# Patient Record
Sex: FEMALE | Race: WHITE | Employment: FULL TIME | URBAN - METROPOLITAN AREA
[De-identification: names, ages, dates, MRNs, and addresses within clinical notes are randomized per-mention and may not be internally consistent; named-entity substitution may affect disease eponyms.]

---

## 2017-05-24 ENCOUNTER — OFFICE VISIT (OUTPATIENT)
Dept: OBGYN CLINIC | Age: 27
End: 2017-05-24

## 2017-05-24 VITALS
BODY MASS INDEX: 46.67 KG/M2 | WEIGHT: 253.6 LBS | DIASTOLIC BLOOD PRESSURE: 72 MMHG | SYSTOLIC BLOOD PRESSURE: 118 MMHG | HEIGHT: 62 IN

## 2017-05-24 DIAGNOSIS — N94.6 DYSMENORRHEA: Primary | ICD-10-CM

## 2017-05-24 DIAGNOSIS — Z20.2 POSSIBLE EXPOSURE TO STD: ICD-10-CM

## 2017-05-24 DIAGNOSIS — N92.6 IRREGULAR BLEEDING: ICD-10-CM

## 2017-05-24 RX ORDER — DOXYCYCLINE 100 MG/1
100 CAPSULE ORAL 2 TIMES DAILY
Qty: 14 CAP | Refills: 0 | Status: SHIPPED | OUTPATIENT
Start: 2017-05-24 | End: 2017-05-31

## 2017-05-24 RX ORDER — MEDROXYPROGESTERONE ACETATE 10 MG/1
10 TABLET ORAL DAILY
Qty: 10 TAB | Refills: 0 | Status: SHIPPED | OUTPATIENT
Start: 2017-05-24 | End: 2017-06-03

## 2017-05-24 NOTE — PATIENT INSTRUCTIONS
Pelvic Exam: Care Instructions  Your Care Instructions    When your doctor examines all of your pelvic organs, it's called a pelvic exam. Two good reasons to have this kind of exam are to check for sexually transmitted infections (STIs) and to get a Pap test. A Pap test is also called a Pap smear. It checks for early changes that can lead to cancer of the cervix. Sometimes a pelvic exam is part of a regular checkup. In this case, you can do some things to make your test results as accurate as possible. · Try to schedule the exam when you don't have your period. · Don't use douches, tampons, or vaginal medicines, sprays, or powders for 24 hours before your exam.  · Don't have sex for 24 hours before your exam.  Other times, women have this kind of exam at any time of the month. This is because they have pelvic pain, bleeding, or discharge. Or they may have another pelvic problem. Before your exam, it's important to share some information with your doctor. For example, if you are a survivor of rape or sexual abuse, you can talk about any concerns you may have. Your doctor will also want to know if you are pregnant or use birth control. And he or she will want to hear about any problems, surgeries, or procedures you have had in your pelvic area. You will also need to tell your doctor when your last period was. Follow-up care is a key part of your treatment and safety. Be sure to make and go to all appointments, and call your doctor if you are having problems. It's also a good idea to know your test results and keep a list of the medicines you take. How is a pelvic exam done? · During a pelvic exam, you will:  ¨ Take off your clothes below the waist. You will get a paper or cloth cover to put over the lower half of your body. May Fang on your back on an exam table. Your feet will be raised above you. Stirrups will support your feet. · The doctor will:  Davidel Grady you to relax your knees.  Your knees need to lean out, toward the walls. ¨ Check the opening of your vagina for sores or swelling. ¨ Gently put a tool called a speculum into your vagina. It opens the vagina a little bit. You will feel some pressure. But if you are relaxed, it will not hurt. It lets your doctor see inside the vagina. ¨ Use a small brush, spatula, or swab to get a sample of cells, if you are having a Pap test or culture. The doctor then removes the speculum. ¨ Put on gloves and put one or two fingers of one hand into your vagina. The other hand goes on your lower belly. This lets your doctor feel your pelvic organs. You will probably feel some pressure. Try to stay relaxed. ¨ Put one gloved finger into your rectum and one into your vagina, if needed. This can also help check your pelvic organs. This exam takes about 10 minutes. At the end, you will get a washcloth or tissue to clean your vaginal area. It's normal to have some discharge after this exam. You can then get dressed. Some test results may be ready right away. But results from a culture or a Pap test may take several days or a few weeks. Why should you have a pelvic exam?  · You want to have recommended screening tests. This includes a Pap test.  · You think you have a vaginal infection. Signs include itching, burning, or unusual discharge. · You might have been exposed to a sexually transmitted infection (STI), such as chlamydia or herpes. · You have vaginal bleeding that is not part of your normal menstrual period. · You have pain in your belly or pelvis. · You have been sexually assaulted. A pelvic exam lets your doctor collect evidence and check for STIs. · You are pregnant. · You are having trouble getting pregnant. What are the risks of a pelvic exam?  There are no risks from a pelvic exam.  When should you call for help?   Watch closely for changes in your health, and be sure to contact your doctor if:  · You have heavy bleeding or discharge from your vagina after the exam.  Where can you learn more? Go to http://leonidas-yovana.info/. Enter N419 in the search box to learn more about \"Pelvic Exam: Care Instructions. \"  Current as of: October 13, 2016  Content Version: 11.2  © 9270-9185 [employer]. Care instructions adapted under license by [employer] (which disclaims liability or warranty for this information). If you have questions about a medical condition or this instruction, always ask your healthcare professional. [place] [address] any warranty or liability for your use of this information.
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
## 2017-05-25 LAB
COMMENT, 144067: NORMAL
HBV SURFACE AG SERPL QL IA: NEGATIVE
HCV AB S/CO SERPL IA: <0.1 S/CO RATIO (ref 0–0.9)
HIV 1+2 AB+HIV1 P24 AG SERPL QL IA: NON REACTIVE
HSV2 IGG SER IA-ACNC: <0.91 INDEX (ref 0–0.9)
RPR SER QL: NON REACTIVE
TSH SERPL DL<=0.005 MIU/L-ACNC: 1.19 UIU/ML (ref 0.45–4.5)

## 2017-05-26 LAB
C TRACH RRNA SPEC QL NAA+PROBE: NEGATIVE
N GONORRHOEA RRNA SPEC QL NAA+PROBE: NEGATIVE
T VAGINALIS RRNA SPEC QL NAA+PROBE: NEGATIVE

## 2017-07-21 ENCOUNTER — OFFICE VISIT (OUTPATIENT)
Dept: OBGYN CLINIC | Age: 27
End: 2017-07-21

## 2017-07-21 VITALS
DIASTOLIC BLOOD PRESSURE: 84 MMHG | SYSTOLIC BLOOD PRESSURE: 132 MMHG | WEIGHT: 255 LBS | HEIGHT: 62 IN | BODY MASS INDEX: 46.93 KG/M2

## 2017-07-21 DIAGNOSIS — Z01.419 WELL FEMALE EXAM WITH ROUTINE GYNECOLOGICAL EXAM: Primary | ICD-10-CM

## 2017-07-21 NOTE — PATIENT INSTRUCTIONS
Pelvic Exam: Care Instructions  Your Care Instructions    When your doctor examines all of your pelvic organs, it's called a pelvic exam. Two good reasons to have this kind of exam are to check for sexually transmitted infections (STIs) and to get a Pap test. A Pap test is also called a Pap smear. It checks for early changes that can lead to cancer of the cervix. Sometimes a pelvic exam is part of a regular checkup. In this case, you can do some things to make your test results as accurate as possible. · Try to schedule the exam when you don't have your period. · Don't use douches, tampons, or vaginal medicines, sprays, or powders for 24 hours before your exam.  · Don't have sex for 24 hours before your exam.  Other times, women have this kind of exam at any time of the month. This is because they have pelvic pain, bleeding, or discharge. Or they may have another pelvic problem. Before your exam, it's important to share some information with your doctor. For example, if you are a survivor of rape or sexual abuse, you can talk about any concerns you may have. Your doctor will also want to know if you are pregnant or use birth control. And he or she will want to hear about any problems, surgeries, or procedures you have had in your pelvic area. You will also need to tell your doctor when your last period was. Follow-up care is a key part of your treatment and safety. Be sure to make and go to all appointments, and call your doctor if you are having problems. It's also a good idea to know your test results and keep a list of the medicines you take. How is a pelvic exam done? · During a pelvic exam, you will:  ¨ Take off your clothes below the waist. You will get a paper or cloth cover to put over the lower half of your body. Clement Shawn on your back on an exam table. Your feet will be raised above you. Stirrups will support your feet. · The doctor will:  Percy Maguire you to relax your knees.  Your knees need to lean out, toward the walls. ¨ Check the opening of your vagina for sores or swelling. ¨ Gently put a tool called a speculum into your vagina. It opens the vagina a little bit. You will feel some pressure. But if you are relaxed, it will not hurt. It lets your doctor see inside the vagina. ¨ Use a small brush, spatula, or swab to get a sample of cells, if you are having a Pap test or culture. The doctor then removes the speculum. ¨ Put on gloves and put one or two fingers of one hand into your vagina. The other hand goes on your lower belly. This lets your doctor feel your pelvic organs. You will probably feel some pressure. Try to stay relaxed. ¨ Put one gloved finger into your rectum and one into your vagina, if needed. This can also help check your pelvic organs. This exam takes about 10 minutes. At the end, you will get a washcloth or tissue to clean your vaginal area. It's normal to have some discharge after this exam. You can then get dressed. Some test results may be ready right away. But results from a culture or a Pap test may take several days or a few weeks. Why should you have a pelvic exam?  · You want to have recommended screening tests. This includes a Pap test.  · You think you have a vaginal infection. Signs include itching, burning, or unusual discharge. · You might have been exposed to a sexually transmitted infection (STI), such as chlamydia or herpes. · You have vaginal bleeding that is not part of your normal menstrual period. · You have pain in your belly or pelvis. · You have been sexually assaulted. A pelvic exam lets your doctor collect evidence and check for STIs. · You are pregnant. · You are having trouble getting pregnant. What are the risks of a pelvic exam?  There are no risks from a pelvic exam.  When should you call for help?   Watch closely for changes in your health, and be sure to contact your doctor if:  · You have heavy bleeding or discharge from your vagina after the exam.  Where can you learn more? Go to http://leonidas-yovana.info/. Enter K337 in the search box to learn more about \"Pelvic Exam: Care Instructions. \"  Current as of: October 13, 2016  Content Version: 11.3  © 5550-6261 [employer], [employer]. Care instructions adapted under license by [employer] (which disclaims liability or warranty for this information). If you have questions about a medical condition or this instruction, always ask your healthcare professional. [address] any warranty or liability for your use of this information.

## 2017-07-21 NOTE — MR AVS SNAPSHOT
Visit Information Date & Time Provider Department Dept. Phone Encounter #  
 7/21/2017  9:30 AM Louisa Verma MD [facility] [phone] [id number] Upcoming Health Maintenance Date Due  
 HPV AGE 9Y-34Y (1 of 3 - Female 3 Dose Series) 12/13/2001 INFLUENZA AGE 9 TO ADULT 8/1/2017 PAP AKA CERVICAL CYTOLOGY 6/15/2019 Allergies as of 7/21/2017  Review Complete On: 7/21/2017 By: Frankie Juan No Known Allergies Current Immunizations  Never Reviewed No immunizations on file. Not reviewed this visit You Were Diagnosed With   
  
 Codes Comments Well female exam with routine gynecological exam    -  Primary ICD-10-CM: N72.353 ICD-9-CM: V72.31 Vitals BP Height(growth percentile) Weight(growth percentile) BMI OB Status Smoking Status 132/84 5' 2\" (1.575 m) 255 lb (115.7 kg) 46.64 kg/m2 IUD Never Smoker BMI and BSA Data Body Mass Index Body Surface Area  
 46.64 kg/m 2 2.25 m 2 Preferred Pharmacy Pharmacy Name Phone [facility] [address] [phone] Your Updated Medication List  
  
Notice  As of 7/21/2017 10:02 AM  
 You have not been prescribed any medications. Patient Instructions Pelvic Exam: Care Instructions Your Care Instructions When your doctor examines all of your pelvic organs, it's called a pelvic exam. Two good reasons to have this kind of exam are to check for sexually transmitted infections (STIs) and to get a Pap test. A Pap test is also called a Pap smear. It checks for early changes that can lead to cancer of the cervix. Sometimes a pelvic exam is part of a regular checkup. In this case, you can do some things to make your test results as accurate as possible. · Try to schedule the exam when you don't have your period.  
· Don't use douches, tampons, or vaginal medicines, sprays, or powders for 24 hours before your exam. 
· Don't have sex for 24 hours before your exam. 
Other times, women have this kind of exam at any time of the month. This is because they have pelvic pain, bleeding, or discharge. Or they may have another pelvic problem. Before your exam, it's important to share some information with your doctor. For example, if you are a survivor of rape or sexual abuse, you can talk about any concerns you may have. Your doctor will also want to know if you are pregnant or use birth control. And he or she will want to hear about any problems, surgeries, or procedures you have had in your pelvic area. You will also need to tell your doctor when your last period was. Follow-up care is a key part of your treatment and safety. Be sure to make and go to all appointments, and call your doctor if you are having problems. It's also a good idea to know your test results and keep a list of the medicines you take. How is a pelvic exam done? · During a pelvic exam, you will: ¨ Take off your clothes below the waist. You will get a paper or cloth cover to put over the lower half of your body. Adina Loges on your back on an exam table. Your feet will be raised above you. Stirrups will support your feet. · The doctor will: ¨ Ask you to relax your knees. Your knees need to lean out, toward the walls. ¨ Check the opening of your vagina for sores or swelling. ¨ Gently put a tool called a speculum into your vagina. It opens the vagina a little bit. You will feel some pressure. But if you are relaxed, it will not hurt. It lets your doctor see inside the vagina. ¨ Use a small brush, spatula, or swab to get a sample of cells, if you are having a Pap test or culture. The doctor then removes the speculum. ¨ Put on gloves and put one or two fingers of one hand into your vagina. The other hand goes on your lower belly. This lets your doctor feel your pelvic organs. You will probably feel some pressure. Try to stay relaxed. ¨ Put one gloved finger into your rectum and one into your vagina, if needed. This can also help check your pelvic organs. This exam takes about 10 minutes. At the end, you will get a washcloth or tissue to clean your vaginal area. It's normal to have some discharge after this exam. You can then get dressed. Some test results may be ready right away. But results from a culture or a Pap test may take several days or a few weeks. Why should you have a pelvic exam? 
· You want to have recommended screening tests. This includes a Pap test. 
· You think you have a vaginal infection. Signs include itching, burning, or unusual discharge. · You might have been exposed to a sexually transmitted infection (STI), such as chlamydia or herpes. · You have vaginal bleeding that is not part of your normal menstrual period. · You have pain in your belly or pelvis. · You have been sexually assaulted. A pelvic exam lets your doctor collect evidence and check for STIs. · You are pregnant. · You are having trouble getting pregnant. What are the risks of a pelvic exam? 
There are no risks from a pelvic exam. 
When should you call for help? Watch closely for changes in your health, and be sure to contact your doctor if: 
· You have heavy bleeding or discharge from your vagina after the exam. 
Where can you learn more? Go to http://leonidas-yovana.info/. Enter Y974 in the search box to learn more about \"Pelvic Exam: Care Instructions. \" Current as of: October 13, 2016 Content Version: 11.3 © 2104-2287 [employer]. Care instructions adapted under license by [employer] (which disclaims liability or warranty for this information). If you have questions about a medical condition or this instruction, always ask your healthcare professional. [place] [address] any warranty or liability for your use of this information. Introducing [facility]!    
 Dear Lorraine Schneider: 
 Thank you for requesting a GoAlbert account. Our records indicate that you already have an active GoAlbert account. You can access your account anytime at [email or web address] Did you know that you can access your hospital and ER discharge instructions at any time in GoAlbert? You can also review all of your test results from your hospital stay or ER visit. Additional Information If you have questions, please visit the Frequently Asked Questions section of the GoAlbert website at [email or web address] Remember, GoAlbert is NOT to be used for urgent needs. For medical emergencies, dial 911. Now available from your iPhone and Android! Please provide this summary of care documentation to your next provider. If you have any questions after today's visit, please call [phone].

## 2017-07-21 NOTE — PROGRESS NOTES
Ximena Acosta is a ,  32 y.o. female [place] whose No LMP recorded. Patient is not currently having periods (Reason: IUD). who presents for her annual checkup. She is having no significant problems. Took Doxy last year with complete resolution of abnl bleeding with IUD    With regard to the Gardisil vaccine, she has received all 3 injections. Menstrual status:    Her periods are spotting, minimal to nonexistent in flow. She is using one to two pads or tampons per day, minimal to none using Mirena. She denies dysmenorrhea. She reports no premenstrual symptoms. Contraception:    The current method of family planning is IUD and She declines contraception and counseling. Sexual history:    She  reports that she currently engages in sexual activity and has had male partners. She reports using the following method of birth control/protection: IUD. Medical conditions:    Since her last annual GYN exam about one year ago, she has not the following changes in her health history: none. Pap and Mammogram History:    Her most recent Pap smear was normal obtained 1 year(s) ago. The patient has never had a mammogram.    The patient does not have a family history of breast cancer. Past Medical History:   Diagnosis Date    Anorexia     Bulimia     Encounter for insertion of mirena IUD 2016    Mirena placed    IBS (irritable bowel syndrome)     Obesity      History reviewed. No pertinent surgical history. Allergies: Review of patient's allergies indicates no known allergies. Social History     Social History    Marital status: SINGLE     Spouse name: N/A    Number of children: N/A    Years of education: N/A     Occupational History    Not on file.      Social History Main Topics    Smoking status: Never Smoker    Smokeless tobacco: Never Used    Alcohol use 0.0 oz/week     0 Standard drinks or equivalent per week      Comment: 1 drink per month    Drug use: No    Sexual activity: Yes     Partners: Male     Birth control/ protection: IUD     Other Topics Concern    Not on file     Social History Narrative     Tobacco History:  reports that she has never smoked. She has never used smokeless tobacco.  Alcohol Abuse:  reports that she drinks alcohol. Drug Abuse:  reports that she does not use illicit drugs. There is no problem list on file for this patient.       Review of Systems - History obtained from the patient  Constitutional: negative for weight loss, fever, night sweats  HEENT: negative for hearing loss, earache, congestion, snoring, sorethroat  CV: negative for chest pain, palpitations, edema  Resp: negative for cough, shortness of breath, wheezing  GI: negative for change in bowel habits, abdominal pain, black or bloody stools  : negative for frequency, dysuria, hematuria, vaginal discharge  MSK: negative for back pain, joint pain, muscle pain  Breast: negative for breast lumps, nipple discharge, galactorrhea  Skin :negative for itching, rash, hives  Neuro: negative for dizziness, headache, confusion, weakness  Psych: negative for anxiety, depression, change in mood  Heme/lymph: negative for bleeding, bruising, pallor    Physical Exam    Visit Vitals    /84    Ht 5' 2\" (1.575 m)    Wt 255 lb (115.7 kg)    BMI 46.64 kg/m2       Constitutional  · Appearance: well-nourished, well developed, alert, in no acute distress    HENT  · Head and Face: appears normal    Neck  · Inspection/Palpation: normal appearance, no masses or tenderness  · Lymph Nodes: no lymphadenopathy present  · Thyroid: gland size normal, nontender, no nodules or masses present on palpation    Chest  · Respiratory Effort: breathing normal  · Auscultation: normal breath sounds    Cardiovascular  · Heart:  · Auscultation: regular rate and rhythm without murmur    Breasts  · Inspection of Breasts: breasts symmetrical, no skin changes, no discharge present, nipple appearance normal, no skin retraction present  · Palpation of Breasts and Axillae: no masses present on palpation, no breast tenderness  · Axillary Lymph Nodes: no lymphadenopathy present    Gastrointestinal  · Abdominal Examination: abdomen non-tender to palpation, normal bowel sounds, no masses present  · Liver and spleen: no hepatomegaly present, spleen not palpable  · Hernias: no hernias identified    Genitourinary  · External Genitalia: normal appearance for age, no discharge present, no tenderness present, no inflammatory lesions present, no masses present, no atrophy present  · Vagina: normal vaginal vault without central or paravaginal defects, no discharge present, no inflammatory lesions present, no masses present  · Bladder: non-tender to palpation  · Urethra: appears normal  · Cervix: normal, string seen   · Uterus: normal size, shape and consistency  · Adnexa: no adnexal tenderness present, no adnexal masses present  · Perineum: perineum within normal limits, no evidence of trauma, no rashes or skin lesions present  · Anus: anus within normal limits, no hemorrhoids present  · Inguinal Lymph Nodes: no lymphadenopathy present    Skin  · General Inspection: no rash, no lesions identified    Neurologic/Psychiatric  · Mental Status:  · Orientation: grossly oriented to person, place and time  · Mood and Affect: mood normal, affect appropriate    . Assessment:  Routine gynecologic examination  Her current medical status is satisfactory with no evidence of significant gynecologic issues.     Plan:  Counseled re: diet, exercise, healthy lifestyle  Return for yearly wellness visits

## 2017-08-08 ENCOUNTER — OFFICE VISIT (OUTPATIENT)
Dept: INTERNAL MEDICINE CLINIC | Age: 27
End: 2017-08-08

## 2017-08-08 VITALS
DIASTOLIC BLOOD PRESSURE: 79 MMHG | SYSTOLIC BLOOD PRESSURE: 118 MMHG | OXYGEN SATURATION: 98 % | RESPIRATION RATE: 16 BRPM | WEIGHT: 253 LBS | HEART RATE: 90 BPM | HEIGHT: 62 IN | BODY MASS INDEX: 46.56 KG/M2 | TEMPERATURE: 97.9 F

## 2017-08-08 DIAGNOSIS — E66.9 OBESITY, UNSPECIFIED OBESITY SEVERITY, UNSPECIFIED OBESITY TYPE: ICD-10-CM

## 2017-08-08 DIAGNOSIS — F32.A DEPRESSION, UNSPECIFIED DEPRESSION TYPE: ICD-10-CM

## 2017-08-08 DIAGNOSIS — Z76.89 ENCOUNTER TO ESTABLISH CARE WITH NEW DOCTOR: Primary | ICD-10-CM

## 2017-08-08 DIAGNOSIS — L30.9 ECZEMA, UNSPECIFIED TYPE: ICD-10-CM

## 2017-08-08 DIAGNOSIS — Z86.59 HISTORY OF BIPOLAR DISORDER: ICD-10-CM

## 2017-08-08 DIAGNOSIS — R53.83 FATIGUE, UNSPECIFIED TYPE: ICD-10-CM

## 2017-08-08 PROBLEM — F31.9 BIPOLAR 1 DISORDER (HCC): Status: ACTIVE | Noted: 2017-08-08

## 2017-08-08 RX ORDER — TRIAMCINOLONE ACETONIDE 1 MG/G
OINTMENT TOPICAL 2 TIMES DAILY
Qty: 80 G | Refills: 0 | Status: SHIPPED | OUTPATIENT
Start: 2017-08-08

## 2017-08-08 NOTE — PROGRESS NOTES
1. Have you been to the ER, urgent care clinic since your last visit? Hospitalized since your last visit? No    2. Have you seen or consulted any other health care providers outside of the [address] [place] [address] since your last visit? Include any pap smears or colon screening. Dr.Cathy Felicita Pedersen in [place], for PCP. Pt was diagnosed with bipolar type 1 with psychosis when she was 16-18. Pt states she came off of medications at 20. Pt has a 15 year old daughter.

## 2017-08-08 NOTE — PROGRESS NOTES
Mikaela Doshi is a 32 y.o. female who presents today for Establish Care; Depression; and Anxiety  . She has a history of   Patient Active Problem List   Diagnosis Code    Bipolar 1 disorder ([facility] F31.9    Depression F32.9   . Today patient is here to establish care. Previous PCP In [place]. she is switching because moved here two years ago. Sees Dr. Susan Beatty for GYN. Records are not available for me to review. she does have other concerns. Eczema: hands and eyes. Using aveno eczema. Worse on her hands. Anxiety/Depression: Diagnosed Bipolar I with psychosis at 12. History of self harm. Suicide attempt in the past with OD. Last attempt was at 19y.o. Has been on SSRI's in the past. Antipsychotics in the past. On no meds for 6 yrs. Over the last several months, seems to have some anxiety and depression. Some fatigue. Decreased drive. No significant mood swings. No current SI/HI. Procrastination seems to be main issue. Home life stable. With mother and father and child. She is in school for [employer]. 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 Mother: with Wellbutrin and has been doing well. Father: Was on SSRI in the past.    No history of sz d/o. Pt denies any manic or hypomanic episodes in the past 6 years. Obesity: has been an issue for some time. Has a history of eating disorder. History of binge eating. No current laxative use or binging or purging. Pt has regained about 20# since moving here. Now exercising regularly. Watching diet. No current orthopedic issues. Social:  No tobacco. No EtOH. No illicit's. Family: Mother: DM, Depressoin    Father HTN. GYN: Dr. Susan Beatty. Has IUD. Father with congenital hip issues. ? What this is. ROS  Review of Systems   Constitutional: Negative for chills, fever and weight loss. HENT: Negative for hearing loss and tinnitus. Eyes: Negative for blurred vision, double vision and photophobia.    Respiratory: Negative for cough and shortness of breath. Cardiovascular: Negative for chest pain, palpitations and leg swelling. Gastrointestinal: Negative for abdominal pain, diarrhea, nausea and vomiting. Genitourinary: Negative for dysuria, frequency and urgency. Skin: Negative for itching and rash. Neurological: Negative. Negative for headaches. Endo/Heme/Allergies: Does not bruise/bleed easily. Psychiatric/Behavioral: Positive for depression. Negative for hallucinations, memory loss, substance abuse and suicidal ideas. The patient is nervous/anxious. The patient does not have insomnia. Visit Vitals    /79 (BP 1 Location: Left arm, BP Patient Position: Sitting)    Pulse 90    Temp 97.9 °F (36.6 °C) (Oral)    Resp 16    Ht 5' 2\" (1.575 m)    Wt 253 lb (114.8 kg)    SpO2 98%    BMI 46.27 kg/m2       Physical Exam   Constitutional: She is oriented to person, place, and time and well-developed, well-nourished, and in no distress. No distress. HENT:   Head: Normocephalic and atraumatic. Mouth/Throat: No oropharyngeal exudate. Eyes: Conjunctivae are normal. Pupils are equal, round, and reactive to light. No scleral icterus. Neck: Normal range of motion. No JVD present. No thyromegaly present. Cardiovascular: Normal rate and regular rhythm. Exam reveals no gallop and no friction rub. No murmur heard. Pulmonary/Chest: Effort normal and breath sounds normal. No respiratory distress. She has no wheezes. Abdominal: Soft. Bowel sounds are normal. She exhibits no distension. There is no rebound and no guarding. Musculoskeletal: Normal range of motion. She exhibits no edema. Neurological: She is alert and oriented to person, place, and time. No cranial nerve deficit. Skin: Skin is dry. No rash noted. She is not diaphoretic. Multiple tattoos.     Psychiatric: Mood, memory, affect and judgment normal.       Current Outpatient Prescriptions   Medication Sig    triamcinolone acetonide (KENALOG) 0.1 % ointment Apply  to affected area two (2) times a day. use thin layer     No current facility-administered medications for this visit. Past Medical History:   Diagnosis Date    Anorexia     Bulimia     Encounter for insertion of mirena IUD 06/24/2016    Mirena placed    IBS (irritable bowel syndrome)     Obesity       History reviewed. No pertinent surgical history. Social History   Substance Use Topics    Smoking status: Never Smoker    Smokeless tobacco: Never Used    Alcohol use 0.0 oz/week     0 Standard drinks or equivalent per week      Comment: 1 drink per month      Family History   Problem Relation Age of Onset    Diabetes Mother     Hypertension Father     Diabetes Brother     HIV/AIDS Brother     Breast Cancer Maternal Grandmother         No Known Allergies     Assessment/Plan  Diagnoses and all orders for this visit:    1. Encounter to establish care with new doctor - no recent PCP    2. Depression, unspecified depression type - new episodes of this. No s/s of bipolar, but concern with wellbutrin would be a manic episode. Mother though is doing well. Will consider a low dose and closely monitor. Will likely help with fatigue/weight. Blood work today. She is to see our psychologist.   -     REFERRAL TO PSYCHOLOGY    3. History of bipolar disorder  -     REFERRAL TO PSYCHOLOGY    4. Fatigue, unspecified type  -     CBC WITH AUTOMATED DIFF  -     METABOLIC PANEL, COMPREHENSIVE  -     HEMOGLOBIN A1C WITH EAG  -     LIPID PANEL    5. Obesity, unspecified obesity severity, unspecified obesity type  -     CBC WITH AUTOMATED DIFF  -     METABOLIC PANEL, COMPREHENSIVE  -     HEMOGLOBIN A1C WITH EAG  -     LIPID PANEL    6. Eczema, unspecified type - Worse on hands. PRN topical steroid. -     triamcinolone acetonide (KENALOG) 0.1 % ointment; Apply  to affected area two (2) times a day.  use thin layer    Over 50% of a visit of 45 minutes spent reviewing diagnosis and treatment options and side effects of medicines. Follow-up Disposition:  Return in about 2 weeks (around 8/22/2017).     Alma Lind MD  8/8/2017

## 2017-08-08 NOTE — MR AVS SNAPSHOT
Visit Information Date & Time Provider Department Dept. Phone Encounter #  
 8/8/2017  9:30 AM Fabián Sanchez MD [facility] [address]  [id number] Follow-up Instructions Return in about 2 weeks (around 8/22/2017). Upcoming Health Maintenance Date Due  
 HPV AGE 9Y-34Y (1 of 3 - Female 3 Dose Series) 12/13/2001 DTaP/Tdap/Td series (1 - Tdap) 12/13/2011 INFLUENZA AGE 9 TO ADULT 8/1/2017 PAP AKA CERVICAL CYTOLOGY 6/15/2019 Allergies as of 8/8/2017  Review Complete On: 8/8/2017 By: Fabián Sanchez MD  
 No Known Allergies Current Immunizations  Never Reviewed No immunizations on file. Not reviewed this visit You Were Diagnosed With   
  
 Codes Comments Encounter to establish care with new doctor    -  Primary ICD-10-CM: Z71.89 ICD-9-CM: V65.8 Depression, unspecified depression type     ICD-10-CM: F32.9 ICD-9-CM: 405 History of bipolar disorder     ICD-10-CM: Z86.59 
ICD-9-CM: V11.1 Fatigue, unspecified type     ICD-10-CM: R53.83 ICD-9-CM: 780.79 Obesity, unspecified obesity severity, unspecified obesity type     ICD-10-CM: E66.9 ICD-9-CM: 278.00 Eczema, unspecified type     ICD-10-CM: L30.9 ICD-9-CM: 692.9 Vitals BP Pulse Temp Resp Height(growth percentile) Weight(growth percentile) 118/79 (BP 1 Location: Left arm, BP Patient Position: Sitting) 90 97.9 °F (36.6 °C) (Oral) 16 5' 2\" (1.575 m) 253 lb (114.8 kg) SpO2 BMI OB Status Smoking Status 98% 46.27 kg/m2 IUD Never Smoker Vitals History BMI and BSA Data Body Mass Index Body Surface Area  
 46.27 kg/m 2 2.24 m 2 Preferred Pharmacy Pharmacy Name Phone [facility] [phone] Your Updated Medication List  
  
   
This list is accurate as of: 8/8/17 10:50 AM.  Always use your most recent med list.  
  
  
  
  
 triamcinolone acetonide 0.1 % ointment Commonly known as:  KENALOG Apply  to affected area two (2) times a day. use thin layer Prescriptions Sent to Pharmacy Refills  
 triamcinolone acetonide (KENALOG) 0.1 % ointment 0 Sig: Apply  to affected area two (2) times a day. use thin layer Class: Normal  
 Pharmacy: [facility] [address] [facility] #: [phone] Route: Topical  
  
We Performed the Following CBC WITH AUTOMATED DIFF [90775 CPT(R)] HEMOGLOBIN A1C WITH EAG [21001 CPT(R)] LIPID PANEL [91092 CPT(R)] METABOLIC PANEL, COMPREHENSIVE [71146 CPT(R)] REFERRAL TO PSYCHOLOGY [BGN42 Custom] Follow-up Instructions Return in about 2 weeks (around 8/22/2017). Referral Information Referral ID Referred By Referred To  
  
 [id number] [facility] [address] [facility] [address] [place] [address] Phone: [phone] Fax: [phone] Visits Status Start Date End Date 1 New Request 8/8/17 8/8/18 If your referral has a status of pending review or denied, additional information will be sent to support the outcome of this decision. Introducing \[facility]\"" & [facility] SERVICES! Dear Ellen Dangelo: Thank you for requesting a [employer] account. Our records indicate that you already have an active [employer] account. You can access your account anytime at [email or web address] Did you know that you can access your hospital and ER discharge instructions at any time in [employer]? You can also review all of your test results from your hospital stay or ER visit. Additional Information If you have questions, please visit the Frequently Asked Questions section of the [employer] website at [email or web address] Remember, [employer] is NOT to be used for urgent needs. For medical emergencies, dial 911. Now available from your iPhone and Android! Please provide this summary of care documentation to your next provider. If you have any questions after today's visit, please call [phone].

## 2017-08-09 LAB
ALBUMIN SERPL-MCNC: 4.1 G/DL (ref 3.5–5.5)
ALBUMIN/GLOB SERPL: 1.6 {RATIO} (ref 1.2–2.2)
ALP SERPL-CCNC: 70 IU/L (ref 39–117)
ALT SERPL-CCNC: 12 IU/L (ref 0–32)
AST SERPL-CCNC: 20 IU/L (ref 0–40)
BASOPHILS # BLD AUTO: 0 X10E3/UL (ref 0–0.2)
BASOPHILS NFR BLD AUTO: 0 %
BILIRUB SERPL-MCNC: 0.5 MG/DL (ref 0–1.2)
BUN SERPL-MCNC: 7 MG/DL (ref 6–20)
BUN/CREAT SERPL: 10 (ref 9–23)
CALCIUM SERPL-MCNC: 8.9 MG/DL (ref 8.7–10.2)
CHLORIDE SERPL-SCNC: 105 MMOL/L (ref 96–106)
CHOLEST SERPL-MCNC: 116 MG/DL (ref 100–199)
CO2 SERPL-SCNC: 23 MMOL/L (ref 18–29)
CREAT SERPL-MCNC: 0.67 MG/DL (ref 0.57–1)
EOSINOPHIL # BLD AUTO: 0.1 X10E3/UL (ref 0–0.4)
EOSINOPHIL NFR BLD AUTO: 1 %
ERYTHROCYTE [DISTWIDTH] IN BLOOD BY AUTOMATED COUNT: 13.8 % (ref 12.3–15.4)
EST. AVERAGE GLUCOSE BLD GHB EST-MCNC: 94 MG/DL
GLOBULIN SER CALC-MCNC: 2.6 G/DL (ref 1.5–4.5)
GLUCOSE SERPL-MCNC: 67 MG/DL (ref 65–99)
HBA1C MFR BLD: 4.9 % (ref 4.8–5.6)
HCT VFR BLD AUTO: 38.6 % (ref 34–46.6)
HDLC SERPL-MCNC: 42 MG/DL
HGB BLD-MCNC: 12.8 G/DL (ref 11.1–15.9)
IMM GRANULOCYTES # BLD: 0 X10E3/UL (ref 0–0.1)
IMM GRANULOCYTES NFR BLD: 0 %
INTERPRETATION, 910389: NORMAL
LDLC SERPL CALC-MCNC: 61 MG/DL (ref 0–99)
LYMPHOCYTES # BLD AUTO: 2 X10E3/UL (ref 0.7–3.1)
LYMPHOCYTES NFR BLD AUTO: 22 %
MCH RBC QN AUTO: 28.3 PG (ref 26.6–33)
MCHC RBC AUTO-ENTMCNC: 33.2 G/DL (ref 31.5–35.7)
MCV RBC AUTO: 85 FL (ref 79–97)
MONOCYTES # BLD AUTO: 0.6 X10E3/UL (ref 0.1–0.9)
MONOCYTES NFR BLD AUTO: 6 %
NEUTROPHILS # BLD AUTO: 6.4 X10E3/UL (ref 1.4–7)
NEUTROPHILS NFR BLD AUTO: 71 %
PLATELET # BLD AUTO: 246 X10E3/UL (ref 150–379)
POTASSIUM SERPL-SCNC: 4.1 MMOL/L (ref 3.5–5.2)
PROT SERPL-MCNC: 6.7 G/DL (ref 6–8.5)
RBC # BLD AUTO: 4.53 X10E6/UL (ref 3.77–5.28)
SODIUM SERPL-SCNC: 141 MMOL/L (ref 134–144)
TRIGL SERPL-MCNC: 64 MG/DL (ref 0–149)
VLDLC SERPL CALC-MCNC: 13 MG/DL (ref 5–40)
WBC # BLD AUTO: 9.1 X10E3/UL (ref 3.4–10.8)

## 2017-08-12 DIAGNOSIS — F32.A DEPRESSION, UNSPECIFIED DEPRESSION TYPE: Primary | ICD-10-CM

## 2017-08-12 RX ORDER — BUPROPION HYDROCHLORIDE 150 MG/1
150 TABLET ORAL
Qty: 30 TAB | Refills: 0 | Status: SHIPPED | OUTPATIENT
Start: 2017-08-12 | End: 2017-09-07 | Stop reason: SDUPTHER

## 2017-09-07 ENCOUNTER — OFFICE VISIT (OUTPATIENT)
Dept: INTERNAL MEDICINE CLINIC | Age: 27
End: 2017-09-07

## 2017-09-07 VITALS
HEIGHT: 62 IN | OXYGEN SATURATION: 98 % | SYSTOLIC BLOOD PRESSURE: 124 MMHG | TEMPERATURE: 99 F | WEIGHT: 253.6 LBS | HEART RATE: 93 BPM | BODY MASS INDEX: 46.67 KG/M2 | DIASTOLIC BLOOD PRESSURE: 80 MMHG | RESPIRATION RATE: 16 BRPM

## 2017-09-07 DIAGNOSIS — F31.9 BIPOLAR 1 DISORDER, DEPRESSED (HCC): Primary | ICD-10-CM

## 2017-09-07 DIAGNOSIS — E66.9 OBESITY, UNSPECIFIED OBESITY SEVERITY, UNSPECIFIED OBESITY TYPE: ICD-10-CM

## 2017-09-07 DIAGNOSIS — F41.9 ANXIETY: ICD-10-CM

## 2017-09-07 DIAGNOSIS — F32.A DEPRESSION, UNSPECIFIED DEPRESSION TYPE: Primary | ICD-10-CM

## 2017-09-07 RX ORDER — BUPROPION HYDROCHLORIDE 300 MG/1
300 TABLET ORAL
Qty: 30 TAB | Refills: 3 | Status: SHIPPED | OUTPATIENT
Start: 2017-09-07 | End: 2017-11-20 | Stop reason: SDUPTHER

## 2017-09-07 NOTE — MR AVS SNAPSHOT
Visit Information Date & Time Provider Department Dept. Phone Encounter #  
 9/7/2017  3:30 PM Abiodun Maravilla MD [facility] [address] [id number] Upcoming Health Maintenance Date Due  
 HPV AGE 9Y-34Y (1 of 3 - Female 3 Dose Series) 12/13/2001 DTaP/Tdap/Td series (1 - Tdap) 12/13/2011 INFLUENZA AGE 9 TO ADULT 8/1/2017 PAP AKA CERVICAL CYTOLOGY 6/15/2019 Allergies as of 9/7/2017  Review Complete On: 9/7/2017 By: Abiodun Maravilla MD  
 No Known Allergies Current Immunizations  Never Reviewed No immunizations on file. Not reviewed this visit You Were Diagnosed With   
  
 Codes Comments Depression, unspecified depression type    -  Primary ICD-10-CM: F32.9 ICD-9-CM: 341 Obesity, unspecified obesity severity, unspecified obesity type     ICD-10-CM: E66.9 ICD-9-CM: 278.00 Vitals BP Pulse Temp Resp Height(growth percentile) Weight(growth percentile) 124/80 (BP 1 Location: Left arm, BP Patient Position: Sitting) 93 99 °F (37.2 °C) (Oral) 16 5' 2\" (1.575 m) 253 lb 9.6 oz (115 kg) SpO2 BMI OB Status Smoking Status 98% 46.38 kg/m2 IUD Never Smoker Vitals History BMI and BSA Data Body Mass Index Body Surface Area  
 46.38 kg/m 2 2.24 m 2 Preferred Pharmacy Pharmacy Name Phone [facility] [address] [phone] Your Updated Medication List  
  
   
This list is accurate as of: 9/7/17  4:04 PM.  Always use your most recent med list.  
  
  
  
  
 buPROPion  mg XL tablet Commonly known as:  Barrie Ranks Take 1 Tab by mouth every morning. triamcinolone acetonide 0.1 % ointment Commonly known as:  KENALOG Apply  to affected area two (2) times a day. use thin layer Prescriptions Sent to Pharmacy Refills  buPROPion XL (WELLBUTRIN XL) 300 mg XL tablet 3  
 Sig: Take 1 Tab by mouth every morning. Class: Normal  
 Pharmacy: [facility] [address] Ph #: [phone] Route: Oral  
  
Introducing [facility] & [facility] SERVICES! Dear James Maurice: Thank you for requesting a "[employer] account. Our records indicate that you already have an active "[employer] account. You can access your account anytime at [email or web address] Did you know that you can access your hospital and ER discharge instructions at any time in "[employer]? You can also review all of your test results from your hospital stay or ER visit. Additional Information If you have questions, please visit the Frequently Asked Questions section of the "[employer] website at [email or web address] Remember, "[employer] is NOT to be used for urgent needs. For medical emergencies, dial 911. Now available from your iPhone and Android! Please provide this summary of care documentation to your next provider. Your primary care clinician is listed as Tao Vasquez. If you have any questions after today's visit, please call [phone].

## 2017-09-07 NOTE — PROGRESS NOTES
Abebe Varela is a 32 y.o. female who presents today for Anxiety and Depression  . She has a history of   Patient Active Problem List   Diagnosis Code    Bipolar 1 disorder ([facility].) F31.9    Depression F32.9   . Today patient is here for f/u. Anxiety/Depression: history of ? Bipolar d/o. On no meds for years. Over the last several months, seems to have some anxiety and depression. Some fatigue. Decreased drive. No significant mood swings. No current SI/HI. Mother on wellbutrin and doing well. Mild concern over bipolar history. Seeing Psych with us who has referred her to Therapist.   Wellbutrin started about one month ago. Notes that mood is better and energy level is better. Notes a bit more anxiety recently, but controlled. Sleep stable. No manic type symptoms. Had been having ? SI, no plan and will not do this. These have become better. No palpitations. ROS  Review of Systems   Constitutional: Negative for chills, fever and weight loss. Respiratory: Negative for cough and shortness of breath. Cardiovascular: Negative for chest pain, palpitations and leg swelling. Gastrointestinal: Negative for abdominal pain, nausea and vomiting. Neurological: Negative. Endo/Heme/Allergies: Does not bruise/bleed easily. Psychiatric/Behavioral: Negative for depression. The patient is not nervous/anxious. Visit Vitals    /80 (BP 1 Location: Left arm, BP Patient Position: Sitting)    Pulse 93    Temp 99 °F (37.2 °C) (Oral)    Resp 16    Ht 5' 2\" (1.575 m)    Wt 253 lb 9.6 oz (115 kg)    SpO2 98%    BMI 46.38 kg/m2       Physical Exam   Constitutional: She is oriented to person, place, and time. She appears well-developed and well-nourished. HENT:   Head: Normocephalic and atraumatic. Cardiovascular: Normal rate and regular rhythm. No murmur heard. Pulmonary/Chest: Effort normal. No respiratory distress.    Neurological: She is alert and oriented to person, place, and time. Skin: Skin is warm and dry. Psychiatric: She has a normal mood and affect. Her behavior is normal.         Current Outpatient Prescriptions   Medication Sig    buPROPion XL (WELLBUTRIN XL) 300 mg XL tablet Take 1 Tab by mouth every morning.  triamcinolone acetonide (KENALOG) 0.1 % ointment Apply  to affected area two (2) times a day. use thin layer     No current facility-administered medications for this visit. Past Medical History:   Diagnosis Date    Anorexia     Bulimia     Encounter for insertion of mirena IUD 06/24/2016    Mirena placed    IBS (irritable bowel syndrome)     Obesity       History reviewed. No pertinent surgical history. Social History   Substance Use Topics    Smoking status: Never Smoker    Smokeless tobacco: Never Used    Alcohol use 0.0 oz/week     0 Standard drinks or equivalent per week      Comment: 1 drink per month      Family History   Problem Relation Age of Onset    Diabetes Mother     Hypertension Father     Diabetes Brother     HIV/AIDS Brother     Breast Cancer Maternal Grandmother         No Known Allergies     Assessment/Plan  Diagnoses and all orders for this visit:    1. Depression, unspecified depression type - better with Bupropion. No s/e. Increase to 300mg.   -     buPROPion XL (WELLBUTRIN XL) 300 mg XL tablet; Take 1 Tab by mouth every morning. 2. Obesity, unspecified obesity severity, unspecified obesity type - stable. Continue diet and exercise. 3. Anxiety - some.  Will be seeing therapist.         Follow-up Disposition: Not on File    Itzel Plasencia MD  9/7/2017

## 2017-09-26 NOTE — PROGRESS NOTES
BEHAVIORAL HEALTH NOTE    This patient was referred to the [address] [place] [address] by Dr. Marylen Odor for symptoms of depression/anxiety. Met with pt. for initial session of 60 minutes to establish contact, to assess symptoms and mental status, identify health behavior goals, and develop plan of care based on readiness to change. Diagnosis:  Bipolar 1 disorder (by report), currently depressed                         Symptoms and mental status: Pt appeared stated age, dressed appropriately, and was pleasant and cooperative throughout the interview. Pt made good eye contact; thoughts were clear and goal-oriented and there was no evidence of disturbances in thought process or content or perceptual disturbances. Mood and affect were mildly depressed/anxious. Insight and judgment were good. Lucy reported that she was 80% better since starting Wellbutrin. She said that her depressive thoughts were less frequent and not as extreme. She is sleeping well and has been working on following a healthy diet. Health Behavior Goals: To manage moods and maintain healthy lifestyle    Social Support: her mother and father (with whom she lives)    Intervention:  Amy Zhong has a good understanding of her diagnosis and was interested in following up with a therapist in the community for some trauma therapy. She was given a number of resources. Plan: Lucy will follow-up with out[facility]pt therapy and [facility] if [facility] is [facility]. [facility] will coordinate with PCP for plan of care.

## 2017-11-20 ENCOUNTER — OFFICE VISIT (OUTPATIENT)
Dept: INTERNAL MEDICINE CLINIC | Age: 27
End: 2017-11-20

## 2017-11-20 VITALS
DIASTOLIC BLOOD PRESSURE: 80 MMHG | TEMPERATURE: 97.9 F | SYSTOLIC BLOOD PRESSURE: 126 MMHG | HEART RATE: 86 BPM | HEIGHT: 62 IN | BODY MASS INDEX: 46.56 KG/M2 | RESPIRATION RATE: 18 BRPM | WEIGHT: 253 LBS | OXYGEN SATURATION: 99 %

## 2017-11-20 DIAGNOSIS — F32.A DEPRESSION, UNSPECIFIED DEPRESSION TYPE: Primary | ICD-10-CM

## 2017-11-20 DIAGNOSIS — J06.9 UPPER RESPIRATORY TRACT INFECTION, UNSPECIFIED TYPE: ICD-10-CM

## 2017-11-20 RX ORDER — FEXOFENADINE HCL AND PSEUDOEPHEDRINE HCI 180; 240 MG/1; MG/1
1 TABLET, EXTENDED RELEASE ORAL DAILY
Qty: 10 TAB | Refills: 0 | Status: SHIPPED | OUTPATIENT
Start: 2017-11-20 | End: 2017-11-30

## 2017-11-20 RX ORDER — BUPROPION HYDROCHLORIDE 150 MG/1
300 TABLET ORAL
Qty: 30 TAB | Refills: 3 | Status: SHIPPED | OUTPATIENT
Start: 2017-11-20 | End: 2017-11-20 | Stop reason: SDUPTHER

## 2017-11-20 RX ORDER — BUPROPION HYDROCHLORIDE 150 MG/1
150 TABLET ORAL
Qty: 30 TAB | Refills: 3 | Status: SHIPPED | OUTPATIENT
Start: 2017-11-20 | End: 2018-08-02 | Stop reason: SDUPTHER

## 2017-11-20 RX ORDER — AMOXICILLIN AND CLAVULANATE POTASSIUM 875; 125 MG/1; MG/1
1 TABLET, FILM COATED ORAL EVERY 12 HOURS
Qty: 20 TAB | Refills: 0 | Status: SHIPPED | OUTPATIENT
Start: 2017-11-20 | End: 2017-11-30

## 2017-11-20 NOTE — MR AVS SNAPSHOT
Visit Information Date & Time Provider Department Dept. Phone Encounter #  
 11/20/2017  3:30 PM Sravan Bernal MD [facility] [address] [id number] Follow-up Instructions Return in about 1 month (around 12/20/2017). Upcoming Health Maintenance Date Due  
 HPV AGE 9Y-34Y (1 of 3 - Female 3 Dose Series) 12/13/2001 DTaP/Tdap/Td series (1 - Tdap) 12/13/2011 Influenza Age 5 to Adult 8/1/2017 PAP AKA CERVICAL CYTOLOGY 6/15/2019 Allergies as of 11/20/2017  Review Complete On: 07/77/7605 By: Julienne [employer] Wears No Known Allergies Current Immunizations  Never Reviewed No immunizations on file. Not reviewed this visit You Were Diagnosed With   
  
 Codes Comments Depression, unspecified depression type    -  Primary ICD-10-CM: F32.9 ICD-9-CM: 531 Upper respiratory tract infection, unspecified type     ICD-10-CM: J06.9 ICD-9-CM: 465.9 Class 3 obesity without serious comorbidity with body mass index (BMI) of 45.0 to 49.9 in adult, unspecified obesity type ([facility]     ICD-10-CM: E66.9, Z68.42 
ICD-9-CM: 278.00, V85.42 Vitals BP Pulse Temp Resp Height(growth percentile) Weight(growth percentile) 126/80 (BP 1 Location: Left arm, BP Patient Position: Sitting) 86 97.9 °F (36.6 °C) (Oral) 18 5' 2\" (1.575 m) 253 lb (114.8 kg) SpO2 BMI OB Status Smoking Status 99% 46.27 kg/m2 IUD Never Smoker Vitals History BMI and BSA Data Body Mass Index Body Surface Area  
 46.27 kg/m 2 2.24 m 2 Preferred Pharmacy Pharmacy Name Phone [facility] [address] [facility] [place], [place] [phone] Your Updated Medication List  
  
   
This list is accurate as of: 11/20/17  4:31 PM.  Always use your most recent med list.  
  
  
  
  
 amoxicillin-clavulanate 875-125 mg per tablet Commonly known as:  AUGMENTIN  
 Take 1 Tab by mouth every twelve (12) hours for 10 days. buPROPion  mg tablet Commonly known as:  [place] Take 2 Tabs by mouth every morning. fexofenadine-pseudoephedrine 180-240 mg per tablet Commonly known as:  ALLEGRA-D 24 Take 1 Tab by mouth daily for 10 days. triamcinolone acetonide 0.1 % ointment Commonly known as:  KENALOG Apply  to affected area two (2) times a day. use thin layer Prescriptions Printed Refills  
 amoxicillin-clavulanate (AUGMENTIN) 875-125 mg per tablet 0 Sig: Take 1 Tab by mouth every twelve (12) hours for 10 days. Class: Print Route: Oral  
  
Prescriptions Sent to Pharmacy Refills buPROPion XL (WELLBUTRIN XL) 150 mg tablet 3 Sig: Take 2 Tabs by mouth every morning. Class: Normal  
 [facility] [address]  Ph #: [phone] Route: Oral  
 fexofenadine-pseudoephedrine (ALLEGRA-D 24) 180-240 mg per tablet 0 Sig: Take 1 Tab by mouth daily for 10 days. Class: Normal  
 Pharmacy: [facility] [address]  Ph #: [phone] Route: Oral  
  
Follow-up Instructions Return in about 1 month (around 12/20/2017). Introducing [facility]! Dear Malcolm Tobias: Thank you for requesting a [employer] account. Our records indicate that you already have an active [employer] account. You can access your account anytime at [email or web address] Did you know that you can access your hospital and ER discharge instructions at any time in [employer]? You can also review all of your test results from your hospital stay or ER visit. Additional Information If you have questions, please visit the Frequently Asked Questions section of the [employer] website at [email or web address] Remember, [employer] is NOT to be used for urgent needs. For medical emergencies, dial 911. Now available from your iPhone and Android! Please provide this summary of care documentation to your next provider. Your primary care clinician is listed as Florentino Crane. If you have any questions after today's visit, please call [phone].

## 2017-11-20 NOTE — PROGRESS NOTES
Zulema Hicks is a 32 y.o. female who presents today for Medication Evaluation and Sinus Infection  . She has a history of   Patient Active Problem List   Diagnosis Code    Bipolar 1 disorder ([facility] F31.9    Depression F32.9   . Today patient is here for f/u.   she does not have other concerns. Weight is still an issue. Has not changed. Still exercising regularly. Upper respiratory illness:  Zulema Hicks presents with complaints of congestion, sore throat and bilateral sinus pain for 7 days. Cough started today. no nausea and no vomiting . she has not had  fever. Symptoms are moderate. Over-the-counter remedies including None     Drinking plenty of fluids: yes  Asthma?:  no  non-smoker  Contacts with similar infections: no       Anxiety/Depression: history of ? Bipolar d/o. On no meds for years. Over the last several months, seems to have some anxiety and depression. Some fatigue. Decreased drive. No significant mood swings. No current SI/HI. Mother on wellbutrin and doing well. Mild concern over bipolar history. Seeing Psych with us who has referred her to Therapist.   Has not been able to get in with therapist.   Increased dose to 300mg at last visit. Since she notes some increased anxiety and poor sleep. No negative thoughts. Crying a bit worse with depression. Notes that ? Worse   Still rare SI, no plan or action. ROS  Review of Systems   Constitutional: Negative for chills, fever and weight loss. Respiratory: Negative for cough and shortness of breath. Cardiovascular: Negative for chest pain, palpitations and leg swelling. Gastrointestinal: Negative for abdominal pain, nausea and vomiting. Neurological: Negative. Endo/Heme/Allergies: Does not bruise/bleed easily. Psychiatric/Behavioral: Positive for depression and suicidal ideas (no plans, Rare thought). The patient has insomnia. The patient is not nervous/anxious.         Visit Vitals    /80 (BP 1 Location: Left arm, BP Patient Position: Sitting)    Pulse 86    Temp 97.9 °F (36.6 °C) (Oral)    Resp 18    Ht 5' 2\" (1.575 m)    Wt 253 lb (114.8 kg)    SpO2 99%    BMI 46.27 kg/m2       Physical Exam   Constitutional: She is oriented to person, place, and time. She appears well-developed and well-nourished. HENT:   Head: Normocephalic and atraumatic. Cardiovascular: Normal rate and regular rhythm. No murmur heard. Pulmonary/Chest: Effort normal. No respiratory distress. Neurological: She is alert and oriented to person, place, and time. Skin: Skin is warm and dry. Psychiatric: She has a normal mood and affect. Her behavior is normal.         Current Outpatient Prescriptions   Medication Sig    fexofenadine-pseudoephedrine (ALLEGRA-D 24) 180-240 mg per tablet Take 1 Tab by mouth daily for 10 days.  amoxicillin-clavulanate (AUGMENTIN) 875-125 mg per tablet Take 1 Tab by mouth every twelve (12) hours for 10 days.  buPROPion XL (WELLBUTRIN XL) 150 mg tablet Take 1 Tab by mouth every morning.  triamcinolone acetonide (KENALOG) 0.1 % ointment Apply  to affected area two (2) times a day. use thin layer     No current facility-administered medications for this visit. Past Medical History:   Diagnosis Date    Anorexia     Bulimia     Encounter for insertion of mirena IUD 06/24/2016    Mirena placed    IBS (irritable bowel syndrome)     Obesity       History reviewed. No pertinent surgical history.    Social History   Substance Use Topics    Smoking status: Never Smoker    Smokeless tobacco: Never Used    Alcohol use 0.0 oz/week     0 Standard drinks or equivalent per week      Comment: 1 drink per month      Family History   Problem Relation Age of Onset    Diabetes Mother     Hypertension Father     Diabetes Brother     HIV/AIDS Brother     Breast Cancer Maternal Grandmother         No Known Allergies     Assessment/Plan  Diagnoses and all orders for this visit: 1. Depression, unspecified depression type - a bit worse. Some increased anxiety. Will cut back down. Close f/u. May need serotonergic agent. Will discuss with family any that have helped. Will get back in touch with psych here. -     buPROPion XL (WELLBUTRIN XL) 150 mg tablet; Take 1 Tab by mouth every morning. 2. Upper respiratory tract infection, unspecified type - allegra D. Given wayne week, Rx given for Augmentin, do not fill unless not better over 3-4 days. -     fexofenadine-pseudoephedrine (ALLEGRA-D 24) 180-240 mg per tablet; Take 1 Tab by mouth daily for 10 days. -     amoxicillin-clavulanate (AUGMENTIN) 875-125 mg per tablet; Take 1 Tab by mouth every twelve (12) hours for 10 days. 3. Class 3 obesity without serious comorbidity with body mass index (BMI) of 45.0 to 49.9 in adult, unspecified obesity type ([facility] - discussed continuing healthy living. Follow-up Disposition:  Return in about 1 month (around 12/20/2017).     Clearance MD Drew  11/20/2017

## 2017-12-06 ENCOUNTER — TELEPHONE (OUTPATIENT)
Dept: INTERNAL MEDICINE CLINIC | Age: 27
End: 2017-12-06

## 2018-08-02 DIAGNOSIS — F32.A DEPRESSION, UNSPECIFIED DEPRESSION TYPE: ICD-10-CM

## 2018-08-02 RX ORDER — BUPROPION HYDROCHLORIDE 150 MG/1
TABLET ORAL
Qty: 30 TAB | Refills: 2 | Status: SHIPPED | OUTPATIENT
Start: 2018-08-02 | End: 2018-10-28 | Stop reason: SDUPTHER

## 2018-10-28 DIAGNOSIS — F32.A DEPRESSION, UNSPECIFIED DEPRESSION TYPE: ICD-10-CM

## 2018-10-28 RX ORDER — BUPROPION HYDROCHLORIDE 150 MG/1
TABLET ORAL
Qty: 30 TAB | Refills: 1 | Status: SHIPPED | OUTPATIENT
Start: 2018-10-28 | End: 2021-06-30

## 2018-11-13 ENCOUNTER — OFFICE VISIT (OUTPATIENT)
Dept: INTERNAL MEDICINE CLINIC | Age: 28
End: 2018-11-13

## 2018-11-13 ENCOUNTER — TELEPHONE (OUTPATIENT)
Dept: INTERNAL MEDICINE CLINIC | Age: 28
End: 2018-11-13

## 2018-11-13 VITALS
OXYGEN SATURATION: 98 % | RESPIRATION RATE: 16 BRPM | HEIGHT: 62 IN | WEIGHT: 266 LBS | SYSTOLIC BLOOD PRESSURE: 143 MMHG | DIASTOLIC BLOOD PRESSURE: 85 MMHG | HEART RATE: 99 BPM | TEMPERATURE: 98.6 F | BODY MASS INDEX: 48.95 KG/M2

## 2018-11-13 DIAGNOSIS — R10.9 ABDOMINAL PAIN, UNSPECIFIED ABDOMINAL LOCATION: Primary | ICD-10-CM

## 2018-11-13 DIAGNOSIS — F31.9 BIPOLAR 1 DISORDER (HCC): ICD-10-CM

## 2018-11-13 DIAGNOSIS — E66.01 OBESITY, MORBID (HCC): ICD-10-CM

## 2018-11-13 DIAGNOSIS — R10.13 EPIGASTRIC PAIN: Primary | ICD-10-CM

## 2018-11-13 DIAGNOSIS — F32.A DEPRESSION, UNSPECIFIED DEPRESSION TYPE: ICD-10-CM

## 2018-11-13 RX ORDER — IBUPROFEN 800 MG/1
800 TABLET ORAL
Qty: 60 TAB | Refills: 1 | Status: SHIPPED | OUTPATIENT
Start: 2018-11-13 | End: 2021-07-09

## 2018-11-13 NOTE — TELEPHONE ENCOUNTER
Patient states she was not able to get scheduled for her CT scan until Monday. Patient states per Dr Vicki Rondon he wanted her to have this completed within the next couple days. Patient states per the scheduling rep, with out a STAT order they can not schedule sooner than Monday. Patient is requesting a STAT order for a sooner apt if waiting until Monday is not okay with Dr Vicki Rondon.      She can be reached at [phone]

## 2018-11-13 NOTE — TELEPHONE ENCOUNTER
Per BS Scheduling , patients CT wad denied by insurance , requesting to speak with the nurse, [phone]

## 2018-11-13 NOTE — PROGRESS NOTES
Vaughn Connors is a 32 y.o. female who presents today for Abdominal Pain [place] She has a history of  
Patient Active Problem List  
Diagnosis Code  Bipolar 1 disorder (HCC) F31.9  Depression F32.9  Obesity, morbid ([facility] E66.01 [place] Today patient Is here for follow-up. Problem visit: 
Vaughn Connors is here for complaint of abdominal wall pain. Problem began 2 week(s) ago. Severity is moderate Since pregnancy has had issues with her abd wall pain. Has chronic IBS, but no new symptoms. Pain chronic. Taking some OTC NSAIDs. Pregnancy was 5 yrs ago. No abdominal surgeries. She states that this feels very musculoskeletal and not GI. No fevers chills no nausea vomiting her bowels have been normal 
 
Anxiety/Depression: We were seeing her for this. Started her on Wellbutrin which improved her symptoms. 300 mg seem to trigger some anxiety and we drop back down to 150 mg. No continued issues. Obesity: Since her last visit her weight has gone up. ROS Review of Systems Constitutional: Negative for chills, fever and weight loss. Respiratory: Negative for cough and shortness of breath. Cardiovascular: Negative for chest pain, palpitations and leg swelling. Gastrointestinal: Positive for abdominal pain. Negative for constipation, diarrhea, nausea and vomiting. Genitourinary: Negative for dysuria, frequency, hematuria and urgency. Neurological: Negative. Endo/Heme/Allergies: Does not bruise/bleed easily. Psychiatric/Behavioral: Negative for depression. The patient is not nervous/anxious. Visit Vitals /85 (BP 1 Location: Left arm, BP Patient Position: Sitting) Pulse 99 Temp 98.6 °F (37 °C) (Oral) Resp 16 Ht 5' 2\" (1.575 m) Wt 266 lb (120.7 kg) SpO2 98% BMI 48.65 kg/m² Physical Exam  
Constitutional: She is oriented to person, place, and time. She appears well-developed and well-nourished.   
HENT:  
 Head: Normocephalic and atraumatic. Cardiovascular: Normal rate and regular rhythm. No murmur heard. Pulmonary/Chest: Effort normal. No respiratory distress. Abdominal: Soft. She exhibits no distension and no mass. There is no guarding. Pain with palpitations. No localizing symptoms. Negative straight leg raise. No obvious hernia Neurological: She is alert and oriented to person, place, and time. Skin: Skin is warm and dry. Psychiatric: She has a normal mood and affect. Her behavior is normal.  
 
 
 
Current Outpatient Medications Medication Sig  ibuprofen (MOTRIN) 800 mg tablet Take 1 Tab by mouth every eight (8) hours as needed for Pain.  buPROPion XL (WELLBUTRIN XL) 150 mg tablet TAKE ONE TABLET BY MOUTH EVERY MORNING  
 triamcinolone acetonide (KENALOG) 0.1 % ointment Apply  to affected area two (2) times a day. use thin layer No current facility-administered medications for this visit. Past Medical History:  
Diagnosis Date  Anorexia  Bulimia  Encounter for insertion of mirena IUD 06/24/2016 Mirena placed  IBS (irritable bowel syndrome)  Obesity History reviewed. No pertinent surgical history. Social History Tobacco Use  Smoking status: Never Smoker  Smokeless tobacco: Never Used Substance Use Topics  Alcohol use: Yes Alcohol/week: 0.0 oz  
  Comment: 1 drink per month Family History Problem Relation Age of Onset  Diabetes Mother  Hypertension Father  Diabetes Brother  HIV/AIDS Brother  Breast Cancer Maternal Grandmother No Known Allergies Assessment/Plan Diagnoses and all orders for this visit: 
 
1. Abdominal pain, unspecified abdominal location - Patient notes that this is abdominal wall pain or muscular pain. No obvious hernia but given body habitus difficult to evaluate. Given the severity of the pain we will get a CT to evaluate abdominal wall.   No systemic signs or symptoms of infection. As needed Motrin in the meantime. 
-     CT ABD PELV WO CONT; Future -     ibuprofen (MOTRIN) 800 mg tablet; Take 1 Tab by mouth every eight (8) hours as needed for Pain. 2. Bipolar 1 disorder ([address] -notes that her depression is stable with Wellbutrin 3. Depression, unspecified depression type 4. Obesity, morbid ([address] -weight is up a little bit counseled on weight loss. See me after CT Follow-up Disposition: Not on File Obdulia Mohan MD 
11/13/2018 Discussed the patient's BMI with her. The BMI follow up plan is as follows:  
 
dietary management education, guidance, and counseling 
encourage exercise 
monitor weight 
prescribed dietary intake An After Visit Summary was printed and given to the patient.

## 2018-11-13 NOTE — PATIENT INSTRUCTIONS
Body Mass Index: Care Instructions Your Care Instructions Body mass index (BMI) can help you see if your weight is raising your risk for health problems. It uses a formula to compare how much you weigh with how tall you are. · A BMI lower than 18.5 is considered underweight. · A BMI between 18.5 and 24.9 is considered healthy. · A BMI between 25 and 29.9 is considered overweight. A BMI of 30 or higher is considered obese. If your BMI is in the normal range, it means that you have a lower risk for weight-related health problems. If your BMI is in the overweight or obese range, you may be at increased risk for weight-related health problems, such as high blood pressure, heart disease, stroke, arthritis or joint pain, and diabetes. If your BMI is in the underweight range, you may be at increased risk for health problems such as fatigue, lower protection (immunity) against illness, muscle loss, bone loss, hair loss, and hormone problems. BMI is just one measure of your risk for weight-related health problems. You may be at higher risk for health problems if you are not active, you eat an unhealthy diet, or you drink too much alcohol or use tobacco products. Follow-up care is a key part of your treatment and safety. Be sure to make and go to all appointments, and call your doctor if you are having problems. It's also a good idea to know your test results and keep a list of the medicines you take. How can you care for yourself at home? · Practice healthy eating habits. This includes eating plenty of fruits, vegetables, whole grains, lean protein, and low-fat dairy. · If your doctor recommends it, get more exercise. Walking is a good choice. Bit by bit, increase the amount you walk every day. Try for at least 30 minutes on most days of the week. · Do not smoke. Smoking can increase your risk for health problems.  If you need help quitting, talk to your doctor about stop-smoking programs and medicines. These can increase your chances of quitting for good. · Limit alcohol to 2 drinks a day for men and 1 drink a day for women. Too much alcohol can cause health problems. If you have a BMI higher than 25 · Your doctor may do other tests to check your risk for weight-related health problems. This may include measuring the distance around your waist. A waist measurement of more than 40 inches in men or 35 inches in women can increase the risk of weight-related health problems. · Talk with your doctor about steps you can take to stay healthy or improve your health. You may need to make lifestyle changes to lose weight and stay healthy, such as changing your diet and getting regular exercise. If you have a BMI lower than 18.5 · Your doctor may do other tests to check your risk for health problems. · Talk with your doctor about steps you can take to stay healthy or improve your health. You may need to make lifestyle changes to gain or maintain weight and stay healthy, such as getting more healthy foods in your diet and doing exercises to build muscle. Where can you learn more? Go to http://leonidas-yovana.info/. Enter S176 in the search box to learn more about \"Body Mass Index: Care Instructions. \" Current as of: October 13, 2016 Content Version: 11.4 © 1091-8904 [employer]. Care instructions adapted under license by [employer] (which disclaims liability or warranty for this information). If you have questions about a medical condition or this instruction, always ask your healthcare professional. [address] any warranty or liability for your use of this information.

## 2018-11-14 ENCOUNTER — HOSPITAL ENCOUNTER (OUTPATIENT)
Dept: ULTRASOUND IMAGING | Age: 28
Discharge: HOME | End: 2018-11-14
Attending: INTERNAL MEDICINE
Payer: COMMERCIAL

## 2018-11-14 ENCOUNTER — TELEPHONE (OUTPATIENT)
Dept: INTERNAL MEDICINE CLINIC | Age: 28
End: 2018-11-14

## 2018-11-14 DIAGNOSIS — R10.9 ABDOMINAL PAIN, UNSPECIFIED ABDOMINAL LOCATION: ICD-10-CM

## 2018-11-14 PROCEDURE — 76705 ECHO EXAM OF ABDOMEN: CPT

## 2018-11-14 RX ORDER — DICYCLOMINE HYDROCHLORIDE 10 MG/1
10 CAPSULE ORAL
Qty: 60 CAP | Refills: 1 | Status: SHIPPED | OUTPATIENT
Start: 2018-11-14 | End: 2021-06-30

## 2018-11-14 NOTE — PROGRESS NOTES
Please inform that there are no acute abnormalities on ultrasound. Suggest anti-inflammatories. Let us know if pain gets worse and/or does not get better by next week and we can order a CT.

## 2021-06-30 ENCOUNTER — HOSPITAL ENCOUNTER (OUTPATIENT)
Dept: PREADMISSION TESTING | Age: 31
Discharge: HOME | End: 2021-06-30
Attending: OBSTETRICS & GYNECOLOGY
Payer: COMMERCIAL

## 2021-06-30 VITALS
DIASTOLIC BLOOD PRESSURE: 78 MMHG | WEIGHT: 293 LBS | SYSTOLIC BLOOD PRESSURE: 120 MMHG | TEMPERATURE: 98.1 F | RESPIRATION RATE: 20 BRPM | BODY MASS INDEX: 53.92 KG/M2 | OXYGEN SATURATION: 97 % | HEART RATE: 90 BPM | HEIGHT: 62 IN

## 2021-06-30 LAB
ABO + RH BLD: NORMAL
ALBUMIN SERPL-MCNC: 3.4 G/DL (ref 3.5–5)
ALBUMIN/GLOB SERPL: 0.8 {RATIO} (ref 1.1–2.2)
ALP SERPL-CCNC: 99 U/L (ref 45–117)
ALT SERPL-CCNC: 20 U/L (ref 12–78)
ANION GAP SERPL CALC-SCNC: 5 MMOL/L (ref 5–15)
APPEARANCE UR: CLEAR
AST SERPL-CCNC: 12 U/L (ref 15–37)
ATRIAL RATE: 86 BPM
BACTERIA URNS QL MICRO: NEGATIVE /HPF
BILIRUB SERPL-MCNC: 0.4 MG/DL (ref 0.2–1)
BILIRUB UR QL: NEGATIVE
BLOOD GROUP ANTIBODIES SERPL: NORMAL
BUN SERPL-MCNC: 11 MG/DL (ref 6–20)
BUN/CREAT SERPL: 16 (ref 12–20)
CALCIUM SERPL-MCNC: 8.7 MG/DL (ref 8.5–10.1)
CALCULATED P AXIS, ECG09: 26 DEGREES
CALCULATED R AXIS, ECG10: 11 DEGREES
CALCULATED T AXIS, ECG11: 16 DEGREES
CHLORIDE SERPL-SCNC: 108 MMOL/L (ref 97–108)
CO2 SERPL-SCNC: 25 MMOL/L (ref 21–32)
COLOR UR: NORMAL
CREAT SERPL-MCNC: 0.7 MG/DL (ref 0.55–1.02)
DIAGNOSIS, 93000: NORMAL
EPITH CASTS URNS QL MICRO: NORMAL /LPF
ERYTHROCYTE [DISTWIDTH] IN BLOOD BY AUTOMATED COUNT: 12.7 % (ref 11.5–14.5)
GLOBULIN SER CALC-MCNC: 4.3 G/DL (ref 2–4)
GLUCOSE SERPL-MCNC: 90 MG/DL (ref 65–100)
GLUCOSE UR STRIP.AUTO-MCNC: NEGATIVE MG/DL
HCT VFR BLD AUTO: 39.5 % (ref 35–47)
HGB BLD-MCNC: 13.3 G/DL (ref 11.5–16)
HGB UR QL STRIP: NEGATIVE
HYALINE CASTS URNS QL MICRO: NORMAL /LPF (ref 0–5)
KETONES UR QL STRIP.AUTO: NEGATIVE MG/DL
LEUKOCYTE ESTERASE UR QL STRIP.AUTO: NEGATIVE
MCH RBC QN AUTO: 28.2 PG (ref 26–34)
MCHC RBC AUTO-ENTMCNC: 33.7 G/DL (ref 30–36.5)
MCV RBC AUTO: 83.7 FL (ref 80–99)
NITRITE UR QL STRIP.AUTO: NEGATIVE
NRBC # BLD: 0 K/UL (ref 0–0.01)
NRBC BLD-RTO: 0 PER 100 WBC
P-R INTERVAL, ECG05: 156 MS
PH UR STRIP: 6 [PH] (ref 5–8)
PLATELET # BLD AUTO: 299 K/UL (ref 150–400)
PMV BLD AUTO: 10 FL (ref 8.9–12.9)
POTASSIUM SERPL-SCNC: 4 MMOL/L (ref 3.5–5.1)
PROT SERPL-MCNC: 7.7 G/DL (ref 6.4–8.2)
PROT UR STRIP-MCNC: NEGATIVE MG/DL
Q-T INTERVAL, ECG07: 358 MS
QRS DURATION, ECG06: 84 MS
QTC CALCULATION (BEZET), ECG08: 428 MS
RBC # BLD AUTO: 4.72 M/UL (ref 3.8–5.2)
RBC #/AREA URNS HPF: NORMAL /HPF (ref 0–5)
SODIUM SERPL-SCNC: 138 MMOL/L (ref 136–145)
SP GR UR REFRACTOMETRY: 1.01 (ref 1–1.03)
SPECIMEN EXP DATE BLD: NORMAL
UA: UC IF INDICATED,UAUC: NORMAL
UROBILINOGEN UR QL STRIP.AUTO: 0.2 EU/DL (ref 0.2–1)
VENTRICULAR RATE, ECG03: 86 BPM
WBC # BLD AUTO: 9.8 K/UL (ref 3.6–11)
WBC URNS QL MICRO: NORMAL /HPF (ref 0–4)

## 2021-06-30 PROCEDURE — 80053 COMPREHEN METABOLIC PANEL: CPT

## 2021-06-30 PROCEDURE — 93005 ELECTROCARDIOGRAM TRACING: CPT

## 2021-06-30 PROCEDURE — 86901 BLOOD TYPING SEROLOGIC RH(D): CPT

## 2021-06-30 PROCEDURE — 81001 URINALYSIS AUTO W/SCOPE: CPT

## 2021-06-30 PROCEDURE — 36415 COLL VENOUS BLD VENIPUNCTURE: CPT

## 2021-06-30 PROCEDURE — 85027 COMPLETE CBC AUTOMATED: CPT

## 2021-06-30 RX ORDER — TRAMADOL HYDROCHLORIDE 50 MG/1
50 TABLET ORAL
COMMUNITY

## 2021-06-30 RX ORDER — QUETIAPINE FUMARATE 50 MG/1
50 TABLET, FILM COATED ORAL 2 TIMES DAILY
COMMUNITY

## 2021-06-30 RX ORDER — PROPRANOLOL HYDROCHLORIDE 10 MG/1
10 TABLET ORAL 3 TIMES DAILY
COMMUNITY

## 2021-06-30 NOTE — PERIOP NOTES
[facility]  Preoperative Instructions        Surgery Date Friday, July 9th          Time of Arrival 12:30pm, [facility] @ [phone]    1. On the day of your surgery, please report to the Surgical Services Registration Desk and sign in at your designated time. The Surgery Center is located to the right of the Emergency Room. 2. You must have someone with you to drive you home. You should not drive a car for 24 hours following surgery. Please make arrangements for a friend or family member to stay with you for the first 24 hours after your surgery. 3. Do not have anything to eat or drink (including water, gum, mints, coffee, juice) after midnight Thursday, July 8th. ?This may not apply to medications prescribed by your physician. ?(Please note below the special instructions with medications to take the morning of your procedure.)    4. We recommend you do not drink any alcoholic beverages for 24 hours before and after your surgery. 5. Contact your surgeons office for instructions on the following medications: non-steroidal anti-inflammatory drugs (i.e. Advil, Aleve), vitamins, and supplements. (Some surgeons will want you to stop these medications prior to surgery and others may allow you to take them)  **If you are currently taking Plavix, Coumadin, Aspirin and/or other blood-thinning agents, contact your surgeon for instructions. ** Your surgeon will partner with the physician prescribing these medications to determine if it is safe to stop or if you need to continue taking. Please do not stop taking these medications without instructions from your surgeon    6. Wear comfortable clothes. Wear glasses instead of contacts. Do not bring any money or jewelry. Please bring picture ID, insurance card, and any prearranged co-payment or hospital payment. Do not wear make-up, particularly mascara the morning of your surgery.   Do not wear nail polish, particularly if you are having foot /hand surgery. Wear your hair loose or down, no ponytails, buns, quinten pins or clips. All body piercings must be removed. Please shower with antibacterial soap for three consecutive days before and on the morning of surgery, but do not apply any lotions, powders or deodorants after the shower on the day of surgery. Please use a fresh towels after each shower. Please sleep in clean clothes and change bed linens the night before surgery. Please do not shave for 48 hours prior to surgery. Shaving of the face is acceptable. 7. You should understand that if you do not follow these instructions your surgery may be cancelled. If your physical condition changes (I.e. fever, cold or flu) please contact your surgeon as soon as possible. 8. It is important that you be on time. If a situation occurs where you may be late, please call [phone] (OR Holding Area). 9. If you have any questions and or problems, please call [phone] (Pre-admission Testing). 10. Your surgery time may be subject to change. You will receive a phone call the evening prior if your time changes. 11.  If having outpatient surgery, you must have someone to drive you here, stay with you during the duration of your stay, and to drive you home at time of discharge. Special Instructions: Follow surgeon instructions for holding all non-steroidal anti-inflammatory drugs (NSAIDs-Ibuprofen/Motrin), blood-thinning agents, vitamins & supplements prior to surgery. TAKE ALL MEDICATIONS DAY OF SURGERY EXCEPT: kenalog ointment      I understand a pre-operative phone call will be made to verify my surgery time. In the event that I am not available, I give permission for a message to be left on my answering service and/or with another person?   yes         ___________________      __________   _________    (Signature of Patient)             (Witness)                (Date and Time)

## 2021-06-30 NOTE — PERIOP NOTES
Incentive Spirometer        Using the incentive spirometer helps expand the small air sacs of your lungs, helps you breathe deeply, and helps improve your lung function. Use your incentive spirometer twice a day (10 breaths each time) prior to surgery. How to Use Your Incentive Spirometer:  1. Hold the incentive spirometer in an upright position. 2. Breathe out as usual.   3. Place the mouthpiece in your mouth and seal your lips tightly around it. 4. Take a deep breath. Breathe in slowly and as deeply as possible. Keep the blue flow rate guide between the arrows. 5. Hold your breath as long as possible. Then exhale slowly and allow the piston to fall to the bottom of the column. 6. Rest for a few seconds and repeat steps one through five at least 10 times. PAT Tidal Volume__1750________________  x__2__  Date__6/30/21__    Lorraine Crank THE INCENTIVE SPIROMETER WITH YOU TO THE HOSPITAL ON THE DAY OF YOUR SURGERY. Opportunity given to ask and answer questions as well as to observe return demonstration.       Patient signature_____________________________          Witness____________________________

## 2021-07-09 ENCOUNTER — HOSPITAL ENCOUNTER (OUTPATIENT)
Age: 31
Setting detail: OUTPATIENT SURGERY
Discharge: HOME | End: 2021-07-09
Attending: OBSTETRICS & GYNECOLOGY | Admitting: OBSTETRICS & GYNECOLOGY
Payer: COMMERCIAL

## 2021-07-09 ENCOUNTER — ANESTHESIA EVENT (OUTPATIENT)
Dept: SURGERY | Age: 31
End: 2021-07-09
Payer: COMMERCIAL

## 2021-07-09 ENCOUNTER — ANESTHESIA (OUTPATIENT)
Dept: SURGERY | Age: 31
End: 2021-07-09
Payer: COMMERCIAL

## 2021-07-09 VITALS
HEIGHT: 62 IN | HEART RATE: 96 BPM | SYSTOLIC BLOOD PRESSURE: 126 MMHG | BODY MASS INDEX: 53.92 KG/M2 | OXYGEN SATURATION: 94 % | RESPIRATION RATE: 13 BRPM | DIASTOLIC BLOOD PRESSURE: 69 MMHG | TEMPERATURE: 98.1 F | WEIGHT: 293 LBS

## 2021-07-09 DIAGNOSIS — R10.2 PELVIC PAIN: Primary | ICD-10-CM

## 2021-07-09 PROCEDURE — 76210000017 HC OR PH I REC 1.5 TO 2 HR: Performed by: OBSTETRICS & GYNECOLOGY

## 2021-07-09 PROCEDURE — 77030008756 HC TU IRR SUC STRY -B: Performed by: OBSTETRICS & GYNECOLOGY

## 2021-07-09 PROCEDURE — 74011000250 HC RX REV CODE- 250: Performed by: NURSE ANESTHETIST, CERTIFIED REGISTERED

## 2021-07-09 PROCEDURE — 74011250636 HC RX REV CODE- 250/636: Performed by: NURSE ANESTHETIST, CERTIFIED REGISTERED

## 2021-07-09 PROCEDURE — 77030010507 HC ADH SKN DERMBND J&J -B: Performed by: OBSTETRICS & GYNECOLOGY

## 2021-07-09 PROCEDURE — 2709999900 HC NON-CHARGEABLE SUPPLY: Performed by: OBSTETRICS & GYNECOLOGY

## 2021-07-09 PROCEDURE — 77030020263 HC SOL INJ SOD CL0.9% LFCR 1000ML: Performed by: OBSTETRICS & GYNECOLOGY

## 2021-07-09 PROCEDURE — 77030010032 HC SCLPL DISECT HARM J&J -C: Performed by: OBSTETRICS & GYNECOLOGY

## 2021-07-09 PROCEDURE — 77030037241 HC PRT ACC BLDLSS AIRSEAL CNMD -B: Performed by: OBSTETRICS & GYNECOLOGY

## 2021-07-09 PROCEDURE — 77030018684: Performed by: OBSTETRICS & GYNECOLOGY

## 2021-07-09 PROCEDURE — 88342 IMHCHEM/IMCYTCHM 1ST ANTB: CPT

## 2021-07-09 PROCEDURE — 74011250636 HC RX REV CODE- 250/636: Performed by: ANESTHESIOLOGY

## 2021-07-09 PROCEDURE — 77030020702 HC ADAPT HARM DISP J&J -B: Performed by: OBSTETRICS & GYNECOLOGY

## 2021-07-09 PROCEDURE — 77030008771 HC TU NG SALEM SUMP -A: Performed by: NURSE ANESTHETIST, CERTIFIED REGISTERED

## 2021-07-09 PROCEDURE — 77030038613 HC SUT PDS STRATA SPIRL J&J -B: Performed by: OBSTETRICS & GYNECOLOGY

## 2021-07-09 PROCEDURE — 74011000258 HC RX REV CODE- 258: Performed by: NURSE ANESTHETIST, CERTIFIED REGISTERED

## 2021-07-09 PROCEDURE — 77030005513 HC CATH URETH FOL11 MDII -B: Performed by: OBSTETRICS & GYNECOLOGY

## 2021-07-09 PROCEDURE — 77030018778 HC MANIP UTER VCAR CNMD -B: Performed by: OBSTETRICS & GYNECOLOGY

## 2021-07-09 PROCEDURE — 77030002933 HC SUT MCRYL J&J -A: Performed by: OBSTETRICS & GYNECOLOGY

## 2021-07-09 PROCEDURE — 77030041236 HC APPL SURG ENDO JNJ -B: Performed by: OBSTETRICS & GYNECOLOGY

## 2021-07-09 PROCEDURE — 77030039147 HC PWDR HEMSTS SURGICEL JNJ -D: Performed by: OBSTETRICS & GYNECOLOGY

## 2021-07-09 PROCEDURE — 77030008606 HC TRCR ENDOSC KII AMR -B: Performed by: OBSTETRICS & GYNECOLOGY

## 2021-07-09 PROCEDURE — 77030025625 HC DEV TISS SEAL J&J -F: Performed by: OBSTETRICS & GYNECOLOGY

## 2021-07-09 PROCEDURE — 74011250637 HC RX REV CODE- 250/637: Performed by: OBSTETRICS & GYNECOLOGY

## 2021-07-09 PROCEDURE — 76060000034 HC ANESTHESIA 1.5 TO 2 HR: Performed by: OBSTETRICS & GYNECOLOGY

## 2021-07-09 PROCEDURE — 77030008684 HC TU ET CUF COVD -B: Performed by: NURSE ANESTHETIST, CERTIFIED REGISTERED

## 2021-07-09 PROCEDURE — 77030016151 HC PROTCTR LNS DFOG COVD -B: Performed by: OBSTETRICS & GYNECOLOGY

## 2021-07-09 PROCEDURE — 88307 TISSUE EXAM BY PATHOLOGIST: CPT

## 2021-07-09 PROCEDURE — 77030013079 HC BLNKT BAIR HGGR 3M -A: Performed by: NURSE ANESTHETIST, CERTIFIED REGISTERED

## 2021-07-09 PROCEDURE — 74011250636 HC RX REV CODE- 250/636: Performed by: OBSTETRICS & GYNECOLOGY

## 2021-07-09 PROCEDURE — 77030026438 HC STYL ET INTUB CARD -A: Performed by: NURSE ANESTHETIST, CERTIFIED REGISTERED

## 2021-07-09 PROCEDURE — 76010000153 HC OR TIME 1.5 TO 2 HR: Performed by: OBSTETRICS & GYNECOLOGY

## 2021-07-09 RX ORDER — SODIUM CHLORIDE, SODIUM LACTATE, POTASSIUM CHLORIDE, CALCIUM CHLORIDE 600; 310; 30; 20 MG/100ML; MG/100ML; MG/100ML; MG/100ML
25 INJECTION, SOLUTION INTRAVENOUS CONTINUOUS
Status: DISCONTINUED | OUTPATIENT
Start: 2021-07-09 | End: 2021-07-09 | Stop reason: HOSPADM

## 2021-07-09 RX ORDER — FENTANYL CITRATE 50 UG/ML
25 INJECTION, SOLUTION INTRAMUSCULAR; INTRAVENOUS
Status: DISCONTINUED | OUTPATIENT
Start: 2021-07-09 | End: 2021-07-09 | Stop reason: HOSPADM

## 2021-07-09 RX ORDER — ROCURONIUM BROMIDE 10 MG/ML
INJECTION, SOLUTION INTRAVENOUS AS NEEDED
Status: DISCONTINUED | OUTPATIENT
Start: 2021-07-09 | End: 2021-07-09 | Stop reason: HOSPADM

## 2021-07-09 RX ORDER — HYDROMORPHONE HYDROCHLORIDE 2 MG/ML
INJECTION, SOLUTION INTRAMUSCULAR; INTRAVENOUS; SUBCUTANEOUS AS NEEDED
Status: DISCONTINUED | OUTPATIENT
Start: 2021-07-09 | End: 2021-07-09 | Stop reason: HOSPADM

## 2021-07-09 RX ORDER — SUCCINYLCHOLINE CHLORIDE 20 MG/ML
INJECTION INTRAMUSCULAR; INTRAVENOUS AS NEEDED
Status: DISCONTINUED | OUTPATIENT
Start: 2021-07-09 | End: 2021-07-09 | Stop reason: HOSPADM

## 2021-07-09 RX ORDER — PROPOFOL 10 MG/ML
INJECTION, EMULSION INTRAVENOUS AS NEEDED
Status: DISCONTINUED | OUTPATIENT
Start: 2021-07-09 | End: 2021-07-09 | Stop reason: HOSPADM

## 2021-07-09 RX ORDER — ONDANSETRON 2 MG/ML
INJECTION INTRAMUSCULAR; INTRAVENOUS AS NEEDED
Status: DISCONTINUED | OUTPATIENT
Start: 2021-07-09 | End: 2021-07-09 | Stop reason: HOSPADM

## 2021-07-09 RX ORDER — MIDAZOLAM HYDROCHLORIDE 1 MG/ML
INJECTION, SOLUTION INTRAMUSCULAR; INTRAVENOUS AS NEEDED
Status: DISCONTINUED | OUTPATIENT
Start: 2021-07-09 | End: 2021-07-09 | Stop reason: HOSPADM

## 2021-07-09 RX ORDER — SODIUM CHLORIDE 0.9 % (FLUSH) 0.9 %
5-40 SYRINGE (ML) INJECTION EVERY 8 HOURS
Status: DISCONTINUED | OUTPATIENT
Start: 2021-07-09 | End: 2021-07-09 | Stop reason: HOSPADM

## 2021-07-09 RX ORDER — SODIUM CHLORIDE 0.9 % (FLUSH) 0.9 %
5-40 SYRINGE (ML) INJECTION AS NEEDED
Status: DISCONTINUED | OUTPATIENT
Start: 2021-07-09 | End: 2021-07-09 | Stop reason: HOSPADM

## 2021-07-09 RX ORDER — DEXAMETHASONE SODIUM PHOSPHATE 4 MG/ML
INJECTION, SOLUTION INTRA-ARTICULAR; INTRALESIONAL; INTRAMUSCULAR; INTRAVENOUS; SOFT TISSUE AS NEEDED
Status: DISCONTINUED | OUTPATIENT
Start: 2021-07-09 | End: 2021-07-09 | Stop reason: HOSPADM

## 2021-07-09 RX ORDER — OXYCODONE HYDROCHLORIDE 5 MG/1
TABLET ORAL
Status: DISCONTINUED
Start: 2021-07-09 | End: 2021-07-09 | Stop reason: HOSPADM

## 2021-07-09 RX ORDER — HYDROMORPHONE HYDROCHLORIDE 2 MG/ML
0.2 INJECTION, SOLUTION INTRAMUSCULAR; INTRAVENOUS; SUBCUTANEOUS
Status: DISCONTINUED | OUTPATIENT
Start: 2021-07-09 | End: 2021-07-09 | Stop reason: HOSPADM

## 2021-07-09 RX ORDER — NEOSTIGMINE METHYLSULFATE 1 MG/ML
INJECTION, SOLUTION INTRAVENOUS AS NEEDED
Status: DISCONTINUED | OUTPATIENT
Start: 2021-07-09 | End: 2021-07-09 | Stop reason: HOSPADM

## 2021-07-09 RX ORDER — DIPHENHYDRAMINE HYDROCHLORIDE 50 MG/ML
12.5 INJECTION, SOLUTION INTRAMUSCULAR; INTRAVENOUS AS NEEDED
Status: DISCONTINUED | OUTPATIENT
Start: 2021-07-09 | End: 2021-07-09 | Stop reason: HOSPADM

## 2021-07-09 RX ORDER — OXYCODONE HYDROCHLORIDE 5 MG/1
5 TABLET ORAL ONCE
Status: COMPLETED | OUTPATIENT
Start: 2021-07-09 | End: 2021-07-09

## 2021-07-09 RX ORDER — LIDOCAINE HYDROCHLORIDE 20 MG/ML
INJECTION, SOLUTION EPIDURAL; INFILTRATION; INTRACAUDAL; PERINEURAL AS NEEDED
Status: DISCONTINUED | OUTPATIENT
Start: 2021-07-09 | End: 2021-07-09 | Stop reason: HOSPADM

## 2021-07-09 RX ORDER — IBUPROFEN 800 MG/1
TABLET ORAL
Qty: 30 TABLET | Refills: 1 | Status: SHIPPED | OUTPATIENT
Start: 2021-07-09

## 2021-07-09 RX ORDER — DEXMEDETOMIDINE HYDROCHLORIDE 100 UG/ML
INJECTION, SOLUTION INTRAVENOUS AS NEEDED
Status: DISCONTINUED | OUTPATIENT
Start: 2021-07-09 | End: 2021-07-09 | Stop reason: HOSPADM

## 2021-07-09 RX ORDER — OXYCODONE HYDROCHLORIDE 5 MG/1
5 TABLET ORAL
Qty: 20 TABLET | Refills: 0 | Status: SHIPPED | OUTPATIENT
Start: 2021-07-09 | End: 2021-07-12

## 2021-07-09 RX ORDER — KETOROLAC TROMETHAMINE 30 MG/ML
INJECTION, SOLUTION INTRAMUSCULAR; INTRAVENOUS AS NEEDED
Status: DISCONTINUED | OUTPATIENT
Start: 2021-07-09 | End: 2021-07-09 | Stop reason: HOSPADM

## 2021-07-09 RX ORDER — ONDANSETRON 4 MG/1
4 TABLET, ORALLY DISINTEGRATING ORAL
Qty: 20 TABLET | Refills: 5 | Status: SHIPPED | OUTPATIENT
Start: 2021-07-09 | End: 2022-07-04

## 2021-07-09 RX ORDER — LIDOCAINE HYDROCHLORIDE 10 MG/ML
0.1 INJECTION, SOLUTION EPIDURAL; INFILTRATION; INTRACAUDAL; PERINEURAL AS NEEDED
Status: DISCONTINUED | OUTPATIENT
Start: 2021-07-09 | End: 2021-07-09 | Stop reason: HOSPADM

## 2021-07-09 RX ORDER — FENTANYL CITRATE 50 UG/ML
INJECTION, SOLUTION INTRAMUSCULAR; INTRAVENOUS AS NEEDED
Status: DISCONTINUED | OUTPATIENT
Start: 2021-07-09 | End: 2021-07-09 | Stop reason: HOSPADM

## 2021-07-09 RX ORDER — GLYCOPYRROLATE 0.2 MG/ML
INJECTION INTRAMUSCULAR; INTRAVENOUS AS NEEDED
Status: DISCONTINUED | OUTPATIENT
Start: 2021-07-09 | End: 2021-07-09 | Stop reason: HOSPADM

## 2021-07-09 RX ADMIN — LIDOCAINE HYDROCHLORIDE 100 MG: 20 INJECTION, SOLUTION INTRAVENOUS at 14:32

## 2021-07-09 RX ADMIN — DEXMEDETOMIDINE HYDROCHLORIDE 10 MCG: 100 INJECTION, SOLUTION, CONCENTRATE INTRAVENOUS at 15:40

## 2021-07-09 RX ADMIN — OXYCODONE 5 MG: 5 TABLET ORAL at 17:55

## 2021-07-09 RX ADMIN — FENTANYL CITRATE 100 MCG: 50 INJECTION, SOLUTION INTRAMUSCULAR; INTRAVENOUS at 14:37

## 2021-07-09 RX ADMIN — SODIUM CHLORIDE, POTASSIUM CHLORIDE, SODIUM LACTATE AND CALCIUM CHLORIDE 25 ML/HR: 600; 310; 30; 20 INJECTION, SOLUTION INTRAVENOUS at 13:36

## 2021-07-09 RX ADMIN — DEXMEDETOMIDINE HYDROCHLORIDE 10 MCG: 100 INJECTION, SOLUTION, CONCENTRATE INTRAVENOUS at 14:32

## 2021-07-09 RX ADMIN — ONDANSETRON HYDROCHLORIDE 4 MG: 2 INJECTION, SOLUTION INTRAMUSCULAR; INTRAVENOUS at 15:36

## 2021-07-09 RX ADMIN — FENTANYL CITRATE 25 MCG: 50 INJECTION, SOLUTION INTRAMUSCULAR; INTRAVENOUS at 16:41

## 2021-07-09 RX ADMIN — DEXAMETHASONE SODIUM PHOSPHATE 8 MG: 4 INJECTION, SOLUTION INTRAMUSCULAR; INTRAVENOUS at 14:45

## 2021-07-09 RX ADMIN — GLYCOPYRROLATE 0.2 MG: 0.2 INJECTION, SOLUTION INTRAMUSCULAR; INTRAVENOUS at 14:20

## 2021-07-09 RX ADMIN — NEOSTIGMINE METHYLSULFATE 3 MG: 1 INJECTION, SOLUTION INTRAVENOUS at 16:04

## 2021-07-09 RX ADMIN — FENTANYL CITRATE 25 MCG: 50 INJECTION, SOLUTION INTRAMUSCULAR; INTRAVENOUS at 16:29

## 2021-07-09 RX ADMIN — PHENYLEPHRINE HYDROCHLORIDE 20 MCG/MIN: 10 INJECTION INTRAVENOUS at 15:00

## 2021-07-09 RX ADMIN — GLYCOPYRROLATE 0.4 MG: 0.2 INJECTION, SOLUTION INTRAMUSCULAR; INTRAVENOUS at 16:04

## 2021-07-09 RX ADMIN — PROPOFOL 170 MG: 10 INJECTION, EMULSION INTRAVENOUS at 14:32

## 2021-07-09 RX ADMIN — CEFAZOLIN 3 G: 1 INJECTION, POWDER, FOR SOLUTION INTRAMUSCULAR; INTRAVENOUS; PARENTERAL at 14:26

## 2021-07-09 RX ADMIN — HYDROMORPHONE HYDROCHLORIDE 0.4 MG: 2 INJECTION, SOLUTION INTRAMUSCULAR; INTRAVENOUS; SUBCUTANEOUS at 15:47

## 2021-07-09 RX ADMIN — ROCURONIUM BROMIDE 20 MG: 10 INJECTION INTRAVENOUS at 15:00

## 2021-07-09 RX ADMIN — ROCURONIUM BROMIDE 10 MG: 10 INJECTION INTRAVENOUS at 14:32

## 2021-07-09 RX ADMIN — Medication 3 AMPULE: at 13:36

## 2021-07-09 RX ADMIN — SUCCINYLCHOLINE CHLORIDE 200 MG: 20 INJECTION, SOLUTION INTRAMUSCULAR; INTRAVENOUS at 14:33

## 2021-07-09 RX ADMIN — HYDROMORPHONE HYDROCHLORIDE 0.4 MG: 2 INJECTION, SOLUTION INTRAMUSCULAR; INTRAVENOUS; SUBCUTANEOUS at 14:50

## 2021-07-09 RX ADMIN — HYDROMORPHONE HYDROCHLORIDE 1 MG: 2 INJECTION, SOLUTION INTRAMUSCULAR; INTRAVENOUS; SUBCUTANEOUS at 16:18

## 2021-07-09 RX ADMIN — KETOROLAC TROMETHAMINE 30 MG: 30 INJECTION, SOLUTION INTRAMUSCULAR; INTRAVENOUS at 16:00

## 2021-07-09 RX ADMIN — MIDAZOLAM HYDROCHLORIDE 2 MG: 1 INJECTION, SOLUTION INTRAMUSCULAR; INTRAVENOUS at 14:20

## 2021-07-09 RX ADMIN — ROCURONIUM BROMIDE 40 MG: 10 INJECTION INTRAVENOUS at 14:38

## 2021-07-09 RX ADMIN — HYDROMORPHONE HYDROCHLORIDE 0.2 MG: 2 INJECTION, SOLUTION INTRAMUSCULAR; INTRAVENOUS; SUBCUTANEOUS at 15:36

## 2021-07-09 NOTE — PERIOP NOTES
No recent covid 19 test result, +vaccinated. Denies S/S  Refused urine pregnancy test, patient has a wife and did not want to get billed for test.  Belongings to PACU, 1 bag  Wife , Bill Lucio in waiting room or via cell.   Call bell in reach, side rails up x 2, bed in low position

## 2021-07-09 NOTE — OP NOTES
Operative Note    Patient ID:   Name: Trish Vargas Record Number: [id number]    YOB: 1990    Preoperative Diagnosis: ENDOMETRIOSIS, PELVIC PAIN    Postoperative Diagnosis: PELVIC PAIN, pelvic inflammation    Surgeon:  Nannette Ribera MD     Assistant:  OR assistant    Anesthesia: General    Procedure: Total Laparoscopic Hysterectomy and bilateral salpingectomy    Findings: normal appearing bilateral ovaries. Markedly injected and inflamed appearing uterus, fallopian tubes, and pelvic peritoneum. There is no starla endometriosis identified. There are no abomino-pelvic adhesions and no upper abdominal disease visualized. Estimated Blood Loss: 50    Drains: none    Pathology /Specimens:     ID Type Source Tests Collected by Time Destination   1 : Uterus, cervix, bilateral fallopian tubes Preservative Pelvis  Andi Lua MD 2021 1535 Pathology       DVT Prophylaxis: SCD Hose    Antibiotic Prophylaxis: Ancef    After understanding the risks, benefits, and alternatives to the proposed procedure, the patient was taken to the operating room, where she was administered general anesthesia in the supine position. She was then placed in the dorsal lithotomy position and prepped and draped in usual sterile fashion. A borrego catheter is placed. A sidearm speculum is introduced into the vagina. There cervix is grasped with a single tooth tenaculum. A VCare uterine manipulator is placed and the balloon inflated with air. Gloves are changed and attention is turned to the abdomen. An umbilical incision is made, and access is attempted using the optiview technique with a 5 mm trocar. Her habitus and the thickness of her abdominal wall at that location precluded complete access into the abdomen, so a left upper quadrant incision is then made, and optiview access is achieved in that location. Pneumoperitoneum is obtained and intraabdominal placement is verified.   There was no bleeding and no evidence of injury to the bowel. 5 mm incisions were then made in the left and right lower quadrants and 5 mm ports placed in each of those under direct visualization. A trocar was also replaced in the umbilicus. Findings were noted as above. The articulating EnSeal was used. The ureters were identified on either side. On the left hand side, the tube and uteroovarian ligament was clamped and divided using the EnSeal. The dissection was then taken down through the round ligament. The anterior peritoneum was incised at the midline. The bladder was dissected off the lower uterine segment and cervix. Posterior peritoneum was taken down, skeletonizing the uterine arteries. The uterine arteries were clamped, coagulated and cut across the ascending branches using EnSeal. Cardinal ligament was developed. The same steps were followed on the right side. The fornices of the vagina are identified via the VCare cup. Colpotomy was made with the Harmonic Hook and carried around the VCare cup. When the specimen was free, it was delivered through the vagina. The bilateral mesosalpinges are divided with the EnSeal to remove both fallopian tubes which are then withdrawn through the trocar. The vaginal cuff was then closed with a running suture of 2-0 Stratafix with care taken to incorporate the angles of the cuff on each side. This stitch is doubled back across the length of the closure. Hemostasis was adequate, but surgicel powder is used to ensure it. The pelvis was irrigated with copious amounts of saline and suctioned away and the powder is applied. Hemostasis was assured. The left and right lower trocars were removed under direct visualization. Once the pneumoperitoneum was completely reduced and hemostasis is still effective, the final trocars were removed. Skin of all incisions was closed with 4-0 Monocryl in a subcuticular closure. 0.5% Marcaine with epinephrine is injected at each incision site. Dermabond was applied to the skin. All sponge, lap, needle, and instrument counts were correct times two. Subsequently, the patient was cleaned up, the borrego was removed, and she was returned to the supine position, awakened, and taken to the recovery room in stable condition.      Signed By: Shirlene Tinoco MD

## 2021-07-09 NOTE — ANESTHESIA POSTPROCEDURE EVALUATION
Procedure(s):  TOTAL LAPAROSCOPIC HYSTERECTOMY, BILATERAL SALPINGECTOMY, EXCISION OF ENDOMETRIOSIS. general    Anesthesia Post Evaluation        Patient location during evaluation: PACU  Note status: Adequate. Level of consciousness: responsive to verbal stimuli and sleepy but conscious  Pain management: satisfactory to patient  Airway patency: patent  Anesthetic complications: no  Cardiovascular status: acceptable  Respiratory status: acceptable  Hydration status: acceptable  Comments: +Post-Anesthesia Evaluation and Assessment    Patient: Shania Suarez MRN: [id number]  SSN: [id number]   YOB: 1990  Age: 27 y.o. Sex: female      Cardiovascular Function/Vital Signs    BP (!) 126/55   Pulse 95   Temp 36.7 °C (98.1 °F)   Resp 16   Ht 5' 2\" (1.575 m)   Wt 138.6 kg (305 lb 8.9 oz)   SpO2 98%   BMI 55.89 kg/m²     Patient is status post Procedure(s):  TOTAL LAPAROSCOPIC HYSTERECTOMY, BILATERAL SALPINGECTOMY, EXCISION OF ENDOMETRIOSIS. Nausea/Vomiting: Controlled. Postoperative hydration reviewed and adequate. Pain:  Pain Scale 1: Visual (07/09/21 1730)  Pain Intensity 1: 0 (07/09/21 1730)   Managed. Neurological Status:   Neuro (WDL): Within Defined Limits (07/09/21 1623)   At baseline. Mental Status and Level of Consciousness: Arousable. Pulmonary Status:   O2 Device: None (Room air) (07/09/21 1715)   Adequate oxygenation and airway patent. Complications related to anesthesia: None    Post-anesthesia assessment completed. No concerns. Signed By: Margarita Ho MD    7/9/2021  Post anesthesia nausea and vomiting:  controlled      INITIAL Post-op Vital signs:   Vitals Value Taken Time   /69 07/09/21 1745   Temp 36.7 °C (98.1 °F) 07/09/21 1730   Pulse 94 07/09/21 1748   Resp 20 07/09/21 1748   SpO2 95 % 07/09/21 1748   Vitals shown include unvalidated device data.
home

## 2021-07-09 NOTE — DISCHARGE INSTRUCTIONS
After Care Instructions for Your Laparoscopic Hysterectomy      1. When you are discharged from the hospital, you should plan to eat a bland, light diet for the first 1-2 days. Avoid spicy or greasy foods and high roughage foods such as salads and raw vegetables (these can cause gas and bloating). Drink plenty of non-caffeinated fluids (water is best). You may resume your usual diet gradually. 2. Avoid heavy lifting or straining. Gradually increase your activity. First try walking and doing light activity around the house. Most women can return to work 2-3 weeks after the procedure. You should speak with your doctor about your individual return to work plan and any other restrictions. 3. You may take showers. Please do not take baths until you are seen for your post-operative visit. 4. It is not unusual to experience some discomfort under your ribs and/or soreness of your neck and shoulders over the first 1-2 days. This is due to the gas used in your abdomen during the surgery to help your doctor see your pelvic organs. This gas gets naturally reabsorbed. The right shoulder is often more sore than the left. 5. It is not unusual to have vaginal spotting lasting up to 2 weeks off and on. Some women do not experience any bleeding at all. You should call your doctor immediately if you ever experience heavy vaginal bleeding or gushes of any liquid coming from your vagina that does not seem like the amount you would expect for your normal vaginal discharge. 6. Bruises may appear around the incisions and will gradually resolve as they heal.    7. There are several ways that your incisions may be closed. Most of these do not require the removal of any sutures. Some patients may have skin glue, Dermabond, placed over the incisions. Do not try to peel this off, it will gradually wear off on its own. Other patients will have small paper strips placed over the incisions.   Allow these to fall off on their own or your doctor will remove them. No other special dressing is required. 8. Call the office at [phone] to report any of the following problems: abdominal pain that is increasing in severity despite using the prescribed pain medication, heavy vaginal bleeding, drainage or separation of your incision(s), temperature greater than 100.4 or persistent nausea and vomiting. 9. You should be seen in the office following your surgery. Call for an appointment if this has not already been arranged. At this appointment, the findings noted at the time of your procedure and /or pathology reports will be reviewed. DISCHARGE SUMMARY from Nurse    PATIENT INSTRUCTIONS:    After general anesthesia or intravenous sedation, for 24 hours or while taking prescription Narcotics:  · Limit your activities  · Do not drive and operate hazardous machinery  · Do not make important personal or business decisions  · Do  not drink alcoholic beverages  · If you have not urinated within 8 hours after discharge, please contact your surgeon on call. Report the following to your surgeon:  · Excessive pain, swelling, redness or odor of or around the surgical area  · Temperature over 100.5  · Nausea and vomiting lasting longer than 4 hours or if unable to take medications  · Any signs of decreased circulation or nerve impairment to extremity: change in color, persistent  numbness, tingling, coldness or increase pain  · Any questions    Patient Education   Oxycodone, Rapid Release (By mouth)   Oxycodone Hydrochloride (ko-t-CIF-done ghada-droe-KLOR-kelly)  Treats moderate to severe pain. This medicine is a narcotic pain reliever. Brand Name(s): Oxaydo, Oxy IR, Roxicodone   There may be other brand names for this medicine. When This Medicine Should Not Be Used: This medicine is not right for everyone.  Do not use it if you had an allergic reaction to oxycodone, codeine, hydrocodone, dihydrocodeine, or morphine, or you have a stomach or bowel blockage. How to Use This Medicine:   Capsule, Liquid, Tablet  · Take your medicine as directed. Your dose may need to be changed several times to find what works best for you. · An overdose can be dangerous. Follow directions carefully so you do not get too much medicine at one time. · Oral liquid: Measure the oral liquid medicine with a marked measuring spoon, oral syringe, or medicine cup. · Oxaydo® tablet: Swallow it whole with enough water to swallow it completely. Do not break, crush, chew, or dissolve it. Do not wet the tablet before you put it in your mouth. · This medicine should come with a Medication Guide. Ask your pharmacist for a copy if you do not have one. · Missed dose: Take a dose as soon as you remember. If it is almost time for your next dose, wait until then and take a regular dose. Do not take extra medicine to make up for a missed dose. · Store the medicine in a closed container at room temperature, away from heat, moisture, and direct light. Store the medicine in a secure place to prevent others from getting it. Ask your pharmacist about the best way to dispose of medicine you do not use. Drugs and Foods to Avoid:   Ask your doctor or pharmacist before using any other medicine, including over-the-counter medicines, vitamins, and herbal products. · Do not use this medicine if you are using or have used an MAO inhibitor within the past 14 days. · Some medicines can affect how oxycodone works. Tell your doctor if you are using any of the following:  ¨ Amiodarone, carbamazepine, erythromycin, ketoconazole, phenytoin, quinidine, rifampin, ritonavir  ¨ Diuretic (water pill)  ¨ Medicine to treat depression or anxiety  ¨ Medicine to treat migraine headaches  ¨ Phenothiazine medicine  · Tell your doctor if you use anything else that makes you sleepy. Some examples are allergy medicine, narcotic pain medicine, and alcohol.  Tell your doctor if you are using buprenorphine, butorphanol, nalbuphine, pentazocine, or a muscle relaxer. · Do not drink alcohol while you are using this medicine. Warnings While Using This Medicine:   · Tell your doctor if you are pregnant or breastfeeding, or if you have kidney disease, liver disease, heart disease, low blood pressure, lung disease or breathing problems (such as asthma, COPD), scoliosis, an enlarged prostate or trouble urinating, an underactive thyroid, Etienne disease, gallbladder or pancreas problems, or digestion problems. Tell your doctor if you have a history of head injury, brain tumor, mental health problems, seizures, or alcohol or drug addiction. · This medicine may cause the following problems:  ¨ High risk of overdose, which can lead to death  ¨ Respiratory depression (serious breathing problem that can be life-threatening)  ¨ Serotonin syndrome, when used with certain medicines  · This medicine may make you dizzy, drowsy, or faint. Do not drive or do anything else that could be dangerous until you know how this medicine affects you. Sit or lie down if you feel dizzy. Stand up carefully. · This medicine can be habit-forming. Do not use more than your prescribed dose. Call your doctor if you think your medicine is not working. · Do not stop using this medicine suddenly. Your doctor will need to slowly decrease your dose before you stop it completely. · This medicine may cause constipation, especially with long-term use. Ask your doctor if you should use a laxative to prevent and treat constipation. Drink plenty of liquids to help avoid constipation. · This medicine could cause infertility. Talk with your doctor before using this medicine if you plan to have children. · Keep all medicine out of the reach of children. Never share your medicine with anyone.   Possible Side Effects While Using This Medicine:   Call your doctor right away if you notice any of these side effects:  · Allergic reaction: Itching or hives, swelling in your face or hands, swelling or tingling in your mouth or throat, chest tightness, trouble breathing  · Anxiety, restlessness, fast heartbeat, fever, sweating, muscle spasms, twitching, nausea, vomiting, diarrhea, seeing or hearing things that are not there  · Blue lips, fingernails, or skin, trouble breathing  · Extreme dizziness or weakness, shallow breathing, slow heartbeat, sweating, cold or clammy skin, seizures  · Lightheadedness, dizziness, fainting  · Severe constipation, stomach pain  If you notice these less serious side effects, talk with your doctor:   · Mild constipation  · Sleepiness, tiredness  If you notice other side effects that you think are caused by this medicine, tell your doctor. Call your doctor for medical advice about side effects. You may report side effects to FDA at 5-370-FDA-3010  © 2017 [facility] Information is for End User's use only and may not be sold, redistributed or otherwise used for commercial purposes. The above information is an  only. It is not intended as medical advice for individual conditions or treatments. Talk to your doctor, nurse or pharmacist before following any medical regimen to see if it is safe and effective for you. How to Care for Your Wound After Its Treated With  DERMABOND* Topical Skin Adhesive  DERMABOND* Topical Skin Adhesive (2-octyl cyanoacrylate) is a sterile, liquid skin adhesive  that holds wound edges together. The film will usually remain in place for 5 to 10 days, then  naturally fall off your skin. The following will answer some of your questions and provide instructions for proper care for your  wound while it is healing:    CHECK WOUND APPEARANCE   Some swelling, redness, and pain are common with all wounds and normally will go away as the  wound heals. If swelling, redness, or pain increases or if the wound feels warm to the touch,  contact a doctor.  Also contact a doctor if the wound edges reopen or separate. REPLACE BANDAGES   If your wound is bandaged, keep the bandage dry.  Replace the dressing daily until the adhesive film has fallen off or if the  bandage should become wet, unless otherwise instructed by your  physician.  When changing the dressing, do not place tape directly over the  DERMABOND adhesive film, because removing the tape later may also  remove the film. AVOID TOPICAL MEDICATIONS   Do not apply liquid or ointment medications or any other product to your wound while the  DERMABOND adhesive film is in place. These may loosen the film before your wound is healed. KEEP WOUND DRY AND PROTECTED   You may occasionally and briefly wet your wound in the shower or bath. Do not soak or scrub  your wound, do not swim, and avoid periods of heavy perspiration until the DERMABOND  adhesive has naturally fallen off. After showering or bathing, gently blot your wound dry with a  soft towel. If a protective dressing is being used, apply a fresh, dry bandage, being sure to keep  the tape off the DERMABOND adhesive film.  Apply a clean, dry bandage over the wound if necessary to protect it.  Protect your wound from injury until the skin has had sufficient time to heal.   Do not scratch, rub, or pick at the DERMABOND adhesive film. This may loosen the film before  your wound is healed.  Protect the wound from prolonged exposure to sunlight or tanning lamps while the film is in  place. If you have any questions or concerns about this product, please consult your doctor.   *[employer]ademark ©[employer] 2002

## 2021-07-09 NOTE — ANESTHESIA PREPROCEDURE EVALUATION
Relevant Problems   NEUROLOGY   (+) Bipolar 1 disorder (HCC)   (+) Depression      ENDOCRINE   (+) Obesity, morbid (HCC)       Anesthetic History   No history of anesthetic complications            Review of Systems / Medical History  Patient summary reviewed, nursing notes reviewed and pertinent labs reviewed    Pulmonary  Within defined limits                 Neuro/Psych         Headaches and psychiatric history    Comments: Bipolar 1 Disorder  Depression  Anxiety   PTSD Cardiovascular  Within defined limits                Exercise tolerance: <4 METS  Comments: ECG (6/30/21):  Normal sinus rhythm   No previous ECGs available   GI/Hepatic/Renal               Comments: IBS (irritable bowel syndrome) Endo/Other        Morbid obesity    Comments: Endometriosis   PCOS (polycystic ovarian syndrome) Other Findings              Physical Exam    Airway  Mallampati: I  TM Distance: > 6 cm  Neck ROM: normal range of motion   Mouth opening: Normal     Cardiovascular  Regular rate and rhythm,  S1 and S2 normal,  no murmur, click, rub, or gallop             Dental  No notable dental hx       Pulmonary  Breath sounds clear to auscultation               Abdominal  GI exam deferred       Other Findings            Anesthetic Plan    ASA: 3  Anesthesia type: general    Monitoring Plan: BIS      Induction: Intravenous  Anesthetic plan and risks discussed with: Patient

## 2021-07-09 NOTE — PERIOP NOTES
Reggie 43 from Operating Room to PACU    Report received from [facility] and LARA Castillo CRNA regarding [address] [facility]. Surgeon(s):  Joseph Charles MD  And Procedure(s) (LRB):  TOTAL LAPAROSCOPIC HYSTERECTOMY, BILATERAL SALPINGECTOMY, EXCISION OF ENDOMETRIOSIS (N/A)  confirmed   with allergies and dressings discussed. Anesthesia type, drugs, patient history, complications, estimated blood loss, vital signs, intake and output, and last pain medication, lines, reversal medications and temperature were reviewed. 1740 Discharge instructions provided to pt's wife Radha Khan. Opportunity for questions clarifications provided. 1800 Pt d/c'd via WC in [facility]. All personal belongings sent with pt.
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
## 2022-03-18 PROBLEM — F31.9 BIPOLAR 1 DISORDER (HCC): Status: ACTIVE | Noted: 2017-08-08

## 2022-03-18 PROBLEM — F32.A DEPRESSION: Status: ACTIVE | Noted: 2017-08-08

## 2022-03-19 PROBLEM — E66.01 OBESITY, MORBID (HCC): Status: ACTIVE | Noted: 2018-11-13

## 2022-03-19 PROBLEM — R10.2 PELVIC PAIN: Status: ACTIVE | Noted: 2021-07-09

## 2023-05-24 RX ORDER — TRAMADOL HYDROCHLORIDE 50 MG/1
50 TABLET ORAL EVERY 8 HOURS PRN
COMMUNITY

## 2023-05-24 RX ORDER — QUETIAPINE FUMARATE 50 MG/1
50 TABLET, FILM COATED ORAL 2 TIMES DAILY
COMMUNITY

## 2023-05-24 RX ORDER — IBUPROFEN 800 MG/1
TABLET ORAL
COMMUNITY
Start: 2021-07-09

## 2023-05-24 RX ORDER — PROPRANOLOL HYDROCHLORIDE 10 MG/1
10 TABLET ORAL 3 TIMES DAILY
COMMUNITY

## (undated) DEVICE — POWDER HEMOSTAT GEL 3.0GR -- SURGICEL

## (undated) DEVICE — Device

## (undated) DEVICE — TOWEL SURG W17XL27IN STD BLU COT NONFENESTRATED PREWASHED

## (undated) DEVICE — SUTURE STRATAFIX SPRL PDS + SZ 2-0 L9IN ABSRB VLT CT-1 SXPP1B456

## (undated) DEVICE — REM POLYHESIVE ADULT PATIENT RETURN ELECTRODE: Brand: VALLEYLAB

## (undated) DEVICE — STERILE POLYISOPRENE POWDER-FREE SURGICAL GLOVES: Brand: PROTEXIS

## (undated) DEVICE — PAD BD MATTRESS 73X32 IN STD CONVOLUTED FOAM LTX FREE

## (undated) DEVICE — SEALER TISS L35CM DIA5MM CRV JAW ARTC ADV BPLR ENSEAL G2

## (undated) DEVICE — ADPT HND SWCH HARM SCALP DISP --

## (undated) DEVICE — BLADE HARM SCALP HK DISSECTING --

## (undated) DEVICE — NEEDLE HYPO 25GA L1.5IN BVL ORIENTED ECLIPSE

## (undated) DEVICE — VISUALIZATION SYSTEM: Brand: CLEARIFY

## (undated) DEVICE — PAD,SANITARY,11 IN,MAXI,N-STRL,IND WRAP: Brand: MEDLINE

## (undated) DEVICE — SURGICEL ENDOSCP APPL

## (undated) DEVICE — ELECTRO LUBE IS A SINGLE PATIENT USE DEVICE THAT IS INTENDED TO BE USED ON ELECTROSURGICAL ELECTRODES TO REDUCE STICKING.: Brand: KEY SURGICAL ELECTRO LUBE

## (undated) DEVICE — PREP SKN CHLRAPRP APL 26ML STR --

## (undated) DEVICE — DERMABOND SKIN ADH 0.7ML -- DERMABOND ADVANCED 12/BX

## (undated) DEVICE — TRI-LUMEN FILTERED TUBE SET WITH ACTIVATED CHARCOAL FILTER: Brand: AIRSEAL

## (undated) DEVICE — GOWN,SIRUS,FABRNF,XL,20/CS: Brand: MEDLINE

## (undated) DEVICE — AIRSEAL 5 MM ACCESS PORT AND LOW PROFILE OBTURATOR WITH BLADELESS OPTICAL TIP, 100 MM LENGTH: Brand: AIRSEAL

## (undated) DEVICE — TROCAR: Brand: KII SLEEVE

## (undated) DEVICE — TRAY PREP DRY W/ PREM GLV 2 APPL 6 SPNG 2 UNDPD 1 OVERWRAP

## (undated) DEVICE — TROCAR: Brand: KII FIOS FIRST ENTRY

## (undated) DEVICE — SUTURE MCRYL SZ 4-0 L27IN ABSRB UD L19MM PS-2 1/2 CIR PRIM Y426H

## (undated) DEVICE — COVER LT HNDL PLAS RIG 1 PER PK

## (undated) DEVICE — SOLUTION IRRIG 1000ML 0.9% SOD CHL USP POUR PLAS BTL

## (undated) DEVICE — SURGICAL PROCEDURE PACK GYN LAPAROSCOPY CUST SMH LF

## (undated) DEVICE — TOTAL TRAY, 16FR 10ML SIL FOLEY, URN: Brand: MEDLINE

## (undated) DEVICE — VCARE MEDIUM, UTERINE MANIPULATOR, VAGINAL-CERVICAL-AHLUWALIA'S-RETRACTOR-ELEVATOR: Brand: VCARE

## (undated) DEVICE — SYR 5ML 1/5 GRAD LL NSAF LF --

## (undated) DEVICE — TUBING, SUCTION, 1/4" X 10', STRAIGHT: Brand: MEDLINE